# Patient Record
Sex: FEMALE | Race: WHITE | NOT HISPANIC OR LATINO
[De-identification: names, ages, dates, MRNs, and addresses within clinical notes are randomized per-mention and may not be internally consistent; named-entity substitution may affect disease eponyms.]

---

## 2018-05-01 ENCOUNTER — APPOINTMENT (OUTPATIENT)
Dept: OBGYN | Facility: CLINIC | Age: 53
End: 2018-05-01
Payer: COMMERCIAL

## 2018-05-01 ENCOUNTER — APPOINTMENT (OUTPATIENT)
Dept: OBGYN | Facility: CLINIC | Age: 53
End: 2018-05-01

## 2018-05-01 VITALS
DIASTOLIC BLOOD PRESSURE: 79 MMHG | HEIGHT: 69 IN | WEIGHT: 200 LBS | SYSTOLIC BLOOD PRESSURE: 128 MMHG | BODY MASS INDEX: 29.62 KG/M2

## 2018-05-01 PROCEDURE — 82270 OCCULT BLOOD FECES: CPT

## 2018-05-01 PROCEDURE — 87210 SMEAR WET MOUNT SALINE/INK: CPT | Mod: QW

## 2018-05-01 PROCEDURE — 99396 PREV VISIT EST AGE 40-64: CPT

## 2018-05-02 LAB — HPV HIGH+LOW RISK DNA PNL CVX: NOT DETECTED

## 2018-05-06 LAB — CYTOLOGY CVX/VAG DOC THIN PREP: NORMAL

## 2018-05-22 ENCOUNTER — APPOINTMENT (OUTPATIENT)
Dept: OBGYN | Facility: CLINIC | Age: 53
End: 2018-05-22

## 2018-12-04 ENCOUNTER — OTHER (OUTPATIENT)
Age: 53
End: 2018-12-04

## 2019-01-03 ENCOUNTER — OTHER (OUTPATIENT)
Age: 54
End: 2019-01-03

## 2019-08-08 ENCOUNTER — OTHER (OUTPATIENT)
Age: 54
End: 2019-08-08

## 2019-10-21 ENCOUNTER — APPOINTMENT (OUTPATIENT)
Dept: OBGYN | Facility: CLINIC | Age: 54
End: 2019-10-21
Payer: COMMERCIAL

## 2019-10-21 VITALS
BODY MASS INDEX: 29.62 KG/M2 | SYSTOLIC BLOOD PRESSURE: 119 MMHG | DIASTOLIC BLOOD PRESSURE: 80 MMHG | WEIGHT: 200 LBS | HEIGHT: 69 IN

## 2019-10-21 PROCEDURE — 99396 PREV VISIT EST AGE 40-64: CPT

## 2019-10-21 PROCEDURE — 87210 SMEAR WET MOUNT SALINE/INK: CPT | Mod: QW

## 2019-10-21 PROCEDURE — 82270 OCCULT BLOOD FECES: CPT

## 2019-10-21 NOTE — PHYSICAL EXAM
[Awake] : awake [Alert] : alert [Acute Distress] : no acute distress [Nipple Discharge] : no nipple discharge [Mass] : no breast mass [Axillary LAD] : no axillary lymphadenopathy [Soft] : soft [Tender] : non tender [Oriented x3] : oriented to person, place, and time [Cystocele] : a cystocele [Normal] : uterus [No Bleeding] : there was no active vaginal bleeding [Uterine Adnexae] : were not tender and not enlarged [Occult Blood] : occult blood test from digital rectal exam was negative [No Tenderness] : no rectal tenderness [Nl Sphincter Tone] : normal sphincter tone [RRR, No Murmurs] : RRR, no murmurs [External Hemorrhoid] : no external hemorrhoids [CTAB] : CTAB

## 2019-10-22 LAB — HPV HIGH+LOW RISK DNA PNL CVX: NOT DETECTED

## 2019-10-25 LAB — CYTOLOGY CVX/VAG DOC THIN PREP: NORMAL

## 2019-11-05 ENCOUNTER — APPOINTMENT (OUTPATIENT)
Dept: OBGYN | Facility: CLINIC | Age: 54
End: 2019-11-05
Payer: COMMERCIAL

## 2019-11-05 ENCOUNTER — ASOB RESULT (OUTPATIENT)
Age: 54
End: 2019-11-05

## 2019-11-05 PROCEDURE — 76830 TRANSVAGINAL US NON-OB: CPT

## 2020-10-27 ENCOUNTER — APPOINTMENT (OUTPATIENT)
Dept: OBGYN | Facility: CLINIC | Age: 55
End: 2020-10-27
Payer: COMMERCIAL

## 2020-10-27 VITALS
BODY MASS INDEX: 28.88 KG/M2 | HEIGHT: 69 IN | SYSTOLIC BLOOD PRESSURE: 155 MMHG | DIASTOLIC BLOOD PRESSURE: 100 MMHG | WEIGHT: 195 LBS

## 2020-10-27 DIAGNOSIS — N83.202 UNSPECIFIED OVARIAN CYST, LEFT SIDE: ICD-10-CM

## 2020-10-27 DIAGNOSIS — B37.9 CANDIDIASIS, UNSPECIFIED: ICD-10-CM

## 2020-10-27 DIAGNOSIS — E11.9 TYPE 2 DIABETES MELLITUS W/OUT COMPLICATIONS: ICD-10-CM

## 2020-10-27 PROCEDURE — 82270 OCCULT BLOOD FECES: CPT

## 2020-10-27 PROCEDURE — 99396 PREV VISIT EST AGE 40-64: CPT

## 2020-10-27 PROCEDURE — 99072 ADDL SUPL MATRL&STAF TM PHE: CPT

## 2020-10-27 PROCEDURE — 87210 SMEAR WET MOUNT SALINE/INK: CPT | Mod: QW

## 2020-10-27 RX ORDER — TERCONAZOLE 80 MG/1
80 SUPPOSITORY VAGINAL
Qty: 1 | Refills: 3 | Status: ACTIVE | COMMUNITY
Start: 2020-10-27 | End: 1900-01-01

## 2020-10-27 NOTE — PHYSICAL EXAM
[Awake] : awake [Alert] : alert [Acute Distress] : no acute distress [Mass] : no breast mass [Nipple Discharge] : no nipple discharge [Axillary LAD] : no axillary lymphadenopathy [Soft] : soft [Tender] : non tender [Oriented x3] : oriented to person, place, and time [Normal] : uterus [Cystocele] : a cystocele [Discharge] : a  ~M vaginal discharge was present [Scant] : scant [Thin] : thin [No Bleeding] : there was no active vaginal bleeding [Uterine Adnexae] : were not tender and not enlarged [No Tenderness] : no rectal tenderness [Occult Blood] : occult blood test from digital rectal exam was negative [Nl Sphincter Tone] : normal sphincter tone [External Hemorrhoid] : no external hemorrhoids [RRR, No Murmurs] : RRR, no murmurs [CTAB] : CTAB

## 2020-10-28 LAB — HPV HIGH+LOW RISK DNA PNL CVX: NOT DETECTED

## 2020-11-02 LAB — CYTOLOGY CVX/VAG DOC THIN PREP: NORMAL

## 2020-11-04 ENCOUNTER — APPOINTMENT (OUTPATIENT)
Dept: OBGYN | Facility: CLINIC | Age: 55
End: 2020-11-04
Payer: COMMERCIAL

## 2020-11-04 ENCOUNTER — ASOB RESULT (OUTPATIENT)
Age: 55
End: 2020-11-04

## 2020-11-04 PROCEDURE — 99072 ADDL SUPL MATRL&STAF TM PHE: CPT

## 2020-11-04 PROCEDURE — 76830 TRANSVAGINAL US NON-OB: CPT

## 2020-11-06 ENCOUNTER — NON-APPOINTMENT (OUTPATIENT)
Age: 55
End: 2020-11-06

## 2020-11-10 ENCOUNTER — APPOINTMENT (OUTPATIENT)
Dept: OBGYN | Facility: CLINIC | Age: 55
End: 2020-11-10
Payer: COMMERCIAL

## 2020-11-10 VITALS
SYSTOLIC BLOOD PRESSURE: 155 MMHG | BODY MASS INDEX: 28.88 KG/M2 | WEIGHT: 195 LBS | DIASTOLIC BLOOD PRESSURE: 93 MMHG | HEIGHT: 69 IN

## 2020-11-10 PROCEDURE — 99214 OFFICE O/P EST MOD 30 MIN: CPT

## 2020-11-10 PROCEDURE — 99072 ADDL SUPL MATRL&STAF TM PHE: CPT

## 2020-11-10 NOTE — PHYSICAL EXAM
[Labia Majora] : normal [Labia Minora] : normal [Normal] : normal [Uterine Adnexae] : normal [FreeTextEntry4] : 1.5 cm cystic mass at 3-4 o'clock lateral to cervix, non tender.

## 2021-01-11 ENCOUNTER — ASOB RESULT (OUTPATIENT)
Age: 56
End: 2021-01-11

## 2021-01-11 ENCOUNTER — APPOINTMENT (OUTPATIENT)
Dept: OBGYN | Facility: CLINIC | Age: 56
End: 2021-01-11
Payer: COMMERCIAL

## 2021-01-11 PROCEDURE — 76830 TRANSVAGINAL US NON-OB: CPT

## 2021-01-11 PROCEDURE — 99072 ADDL SUPL MATRL&STAF TM PHE: CPT

## 2021-01-13 ENCOUNTER — TRANSCRIPTION ENCOUNTER (OUTPATIENT)
Age: 56
End: 2021-01-13

## 2021-02-09 ENCOUNTER — NON-APPOINTMENT (OUTPATIENT)
Age: 56
End: 2021-02-09

## 2021-03-01 ENCOUNTER — NON-APPOINTMENT (OUTPATIENT)
Age: 56
End: 2021-03-01

## 2022-07-12 ENCOUNTER — NON-APPOINTMENT (OUTPATIENT)
Age: 57
End: 2022-07-12

## 2022-07-20 ENCOUNTER — NON-APPOINTMENT (OUTPATIENT)
Age: 57
End: 2022-07-20

## 2022-10-14 ENCOUNTER — NON-APPOINTMENT (OUTPATIENT)
Age: 57
End: 2022-10-14

## 2022-10-18 ENCOUNTER — APPOINTMENT (OUTPATIENT)
Dept: OBGYN | Facility: CLINIC | Age: 57
End: 2022-10-18

## 2022-10-18 VITALS
BODY MASS INDEX: 29.18 KG/M2 | HEIGHT: 69 IN | DIASTOLIC BLOOD PRESSURE: 88 MMHG | SYSTOLIC BLOOD PRESSURE: 172 MMHG | WEIGHT: 197 LBS

## 2022-10-18 DIAGNOSIS — N89.8 OTHER SPECIFIED NONINFLAMMATORY DISORDERS OF VAGINA: ICD-10-CM

## 2022-10-18 PROCEDURE — 82270 OCCULT BLOOD FECES: CPT

## 2022-10-18 PROCEDURE — 99396 PREV VISIT EST AGE 40-64: CPT

## 2022-10-18 PROCEDURE — 87210 SMEAR WET MOUNT SALINE/INK: CPT | Mod: QW

## 2022-10-18 NOTE — PHYSICAL EXAM
[Awake] : awake [Alert] : alert [Acute Distress] : no acute distress [Mass] : no breast mass [Nipple Discharge] : no nipple discharge [Axillary LAD] : no axillary lymphadenopathy [Soft] : soft [Tender] : non tender [Oriented x3] : oriented to person, place, and time [Normal] : uterus [Cystocele] : a cystocele [Discharge] : no discharge [No Bleeding] : there was no active vaginal bleeding [Uterine Adnexae] : were not tender and not enlarged [No Tenderness] : no rectal tenderness [Occult Blood] : occult blood test from digital rectal exam was negative [Nl Sphincter Tone] : normal sphincter tone [External Hemorrhoid] : no external hemorrhoids [RRR, No Murmurs] : RRR, no murmurs [CTAB] : CTAB

## 2022-10-20 LAB — HPV HIGH+LOW RISK DNA PNL CVX: NOT DETECTED

## 2022-10-25 ENCOUNTER — APPOINTMENT (OUTPATIENT)
Dept: OBGYN | Facility: CLINIC | Age: 57
End: 2022-10-25

## 2022-10-25 ENCOUNTER — ASOB RESULT (OUTPATIENT)
Age: 57
End: 2022-10-25

## 2022-10-25 LAB — CYTOLOGY CVX/VAG DOC THIN PREP: NORMAL

## 2022-10-25 PROCEDURE — 76830 TRANSVAGINAL US NON-OB: CPT

## 2023-10-13 DIAGNOSIS — Z12.39 ENCOUNTER FOR OTHER SCREENING FOR MALIGNANT NEOPLASM OF BREAST: ICD-10-CM

## 2023-10-22 ENCOUNTER — NON-APPOINTMENT (OUTPATIENT)
Age: 58
End: 2023-10-22

## 2023-11-01 ENCOUNTER — NON-APPOINTMENT (OUTPATIENT)
Age: 58
End: 2023-11-01

## 2023-11-07 ENCOUNTER — APPOINTMENT (OUTPATIENT)
Dept: OBGYN | Facility: CLINIC | Age: 58
End: 2023-11-07
Payer: COMMERCIAL

## 2023-11-07 VITALS
BODY MASS INDEX: 24.44 KG/M2 | WEIGHT: 165 LBS | HEIGHT: 69 IN | DIASTOLIC BLOOD PRESSURE: 70 MMHG | SYSTOLIC BLOOD PRESSURE: 118 MMHG

## 2023-11-07 PROCEDURE — 99396 PREV VISIT EST AGE 40-64: CPT

## 2023-11-07 PROCEDURE — 87210 SMEAR WET MOUNT SALINE/INK: CPT | Mod: QW

## 2023-11-07 PROCEDURE — 82270 OCCULT BLOOD FECES: CPT

## 2023-11-08 LAB
CYTOLOGY CVX/VAG DOC THIN PREP: NORMAL
HPV HIGH+LOW RISK DNA PNL CVX: NOT DETECTED

## 2023-11-11 LAB — CYTOLOGY CVX/VAG DOC THIN PREP: NORMAL

## 2023-12-05 ENCOUNTER — ASOB RESULT (OUTPATIENT)
Age: 58
End: 2023-12-05

## 2023-12-05 ENCOUNTER — APPOINTMENT (OUTPATIENT)
Dept: OBGYN | Facility: CLINIC | Age: 58
End: 2023-12-05
Payer: COMMERCIAL

## 2023-12-05 PROCEDURE — 76830 TRANSVAGINAL US NON-OB: CPT

## 2023-12-06 ENCOUNTER — NON-APPOINTMENT (OUTPATIENT)
Age: 58
End: 2023-12-06

## 2024-10-01 ENCOUNTER — NON-APPOINTMENT (OUTPATIENT)
Age: 59
End: 2024-10-01

## 2024-10-01 ENCOUNTER — APPOINTMENT (OUTPATIENT)
Dept: OBGYN | Facility: CLINIC | Age: 59
End: 2024-10-01
Payer: COMMERCIAL

## 2024-10-01 PROCEDURE — 99459 PELVIC EXAMINATION: CPT

## 2024-10-01 PROCEDURE — 99214 OFFICE O/P EST MOD 30 MIN: CPT

## 2024-10-01 NOTE — CHIEF COMPLAINT
[FreeTextEntry1] : Here for evaluation of possible UTI. c/o 2 weeks of painful urination accompanies with burning and pressure Had been on a course of Cipro x 7 and Macrobid x 5 without relief. Has taken Azo which relieved the pain but not the burning. u/a: tr leuk and blood

## 2024-10-01 NOTE — PHYSICAL EXAM
[Chaperone Present] : A chaperone was present in the examining room during all aspects of the physical examination [[id number]] : A chaperone was present during the pelvic exam. [FreeTextEntry2] : Mini [Normal] : uterus [No Bleeding] : there was no active vaginal bleeding [Uterine Adnexae] : were not tender and not enlarged [FreeTextEntry4] : tomas olivas

## 2024-10-03 LAB — BACTERIA UR CULT: NORMAL

## 2024-10-04 ENCOUNTER — APPOINTMENT (OUTPATIENT)
Dept: OBGYN | Facility: CLINIC | Age: 59
End: 2024-10-04
Payer: COMMERCIAL

## 2024-10-04 DIAGNOSIS — R30.0 DYSURIA: ICD-10-CM

## 2024-10-04 PROCEDURE — ZZZZZ: CPT

## 2024-10-06 ENCOUNTER — INPATIENT (INPATIENT)
Facility: HOSPITAL | Age: 59
LOS: 2 days | Discharge: HOME | End: 2024-10-09
Attending: INTERNAL MEDICINE | Admitting: INTERNAL MEDICINE
Payer: COMMERCIAL

## 2024-10-06 VITALS
RESPIRATION RATE: 18 BRPM | SYSTOLIC BLOOD PRESSURE: 115 MMHG | OXYGEN SATURATION: 98 % | WEIGHT: 165.35 LBS | DIASTOLIC BLOOD PRESSURE: 70 MMHG | TEMPERATURE: 98 F | HEART RATE: 85 BPM | HEIGHT: 68 IN

## 2024-10-06 DIAGNOSIS — I10 ESSENTIAL (PRIMARY) HYPERTENSION: ICD-10-CM

## 2024-10-06 DIAGNOSIS — E11.9 TYPE 2 DIABETES MELLITUS WITHOUT COMPLICATIONS: ICD-10-CM

## 2024-10-06 DIAGNOSIS — K80.20 CALCULUS OF GALLBLADDER WITHOUT CHOLECYSTITIS WITHOUT OBSTRUCTION: ICD-10-CM

## 2024-10-06 DIAGNOSIS — N39.0 URINARY TRACT INFECTION, SITE NOT SPECIFIED: ICD-10-CM

## 2024-10-06 DIAGNOSIS — D72.825 BANDEMIA: ICD-10-CM

## 2024-10-06 DIAGNOSIS — Z29.9 ENCOUNTER FOR PROPHYLACTIC MEASURES, UNSPECIFIED: ICD-10-CM

## 2024-10-06 LAB
ALBUMIN SERPL ELPH-MCNC: 4.4 G/DL — SIGNIFICANT CHANGE UP (ref 3.3–5)
ALP SERPL-CCNC: 74 U/L — SIGNIFICANT CHANGE UP (ref 40–120)
ALT FLD-CCNC: 25 U/L — SIGNIFICANT CHANGE UP (ref 4–33)
ANION GAP SERPL CALC-SCNC: 20 MMOL/L — HIGH (ref 7–14)
APPEARANCE UR: ABNORMAL
AST SERPL-CCNC: 26 U/L — SIGNIFICANT CHANGE UP (ref 4–32)
BACTERIA UR CULT: NORMAL
BASOPHILS # BLD AUTO: 0.08 K/UL — SIGNIFICANT CHANGE UP (ref 0–0.2)
BASOPHILS NFR BLD AUTO: 0.9 % — SIGNIFICANT CHANGE UP (ref 0–2)
BILIRUB SERPL-MCNC: 0.7 MG/DL — SIGNIFICANT CHANGE UP (ref 0.2–1.2)
BILIRUB UR-MCNC: ABNORMAL
BUN SERPL-MCNC: 22 MG/DL — SIGNIFICANT CHANGE UP (ref 7–23)
CALCIUM SERPL-MCNC: 9.2 MG/DL — SIGNIFICANT CHANGE UP (ref 8.4–10.5)
CHLORIDE SERPL-SCNC: 92 MMOL/L — LOW (ref 98–107)
CO2 SERPL-SCNC: 23 MMOL/L — SIGNIFICANT CHANGE UP (ref 22–31)
COLOR SPEC: SIGNIFICANT CHANGE UP
CREAT SERPL-MCNC: 0.94 MG/DL — SIGNIFICANT CHANGE UP (ref 0.5–1.3)
DIFF PNL FLD: ABNORMAL
EGFR: 70 ML/MIN/1.73M2 — SIGNIFICANT CHANGE UP
EOSINOPHIL # BLD AUTO: 0.23 K/UL — SIGNIFICANT CHANGE UP (ref 0–0.5)
EOSINOPHIL NFR BLD AUTO: 2.6 % — SIGNIFICANT CHANGE UP (ref 0–6)
GLUCOSE SERPL-MCNC: 155 MG/DL — HIGH (ref 70–99)
GLUCOSE UR QL: >=1000 MG/DL
HCT VFR BLD CALC: 40 % — SIGNIFICANT CHANGE UP (ref 34.5–45)
HGB BLD-MCNC: 13.9 G/DL — SIGNIFICANT CHANGE UP (ref 11.5–15.5)
IANC: 6.76 K/UL — SIGNIFICANT CHANGE UP (ref 1.8–7.4)
KETONES UR-MCNC: 15 MG/DL
LEUKOCYTE ESTERASE UR-ACNC: ABNORMAL
LIDOCAIN IGE QN: 31 U/L — SIGNIFICANT CHANGE UP (ref 7–60)
LYMPHOCYTES # BLD AUTO: 0.55 K/UL — LOW (ref 1–3.3)
LYMPHOCYTES # BLD AUTO: 6.2 % — LOW (ref 13–44)
MCHC RBC-ENTMCNC: 29.6 PG — SIGNIFICANT CHANGE UP (ref 27–34)
MCHC RBC-ENTMCNC: 34.8 GM/DL — SIGNIFICANT CHANGE UP (ref 32–36)
MCV RBC AUTO: 85.3 FL — SIGNIFICANT CHANGE UP (ref 80–100)
MONOCYTES # BLD AUTO: 0.39 K/UL — SIGNIFICANT CHANGE UP (ref 0–0.9)
MONOCYTES NFR BLD AUTO: 4.4 % — SIGNIFICANT CHANGE UP (ref 2–14)
NEUTROPHILS # BLD AUTO: 7.05 K/UL — SIGNIFICANT CHANGE UP (ref 1.8–7.4)
NEUTROPHILS NFR BLD AUTO: 54 % — SIGNIFICANT CHANGE UP (ref 43–77)
NITRITE UR-MCNC: POSITIVE
PH UR: 6 — SIGNIFICANT CHANGE UP (ref 5–8)
PLATELET # BLD AUTO: 179 K/UL — SIGNIFICANT CHANGE UP (ref 150–400)
POTASSIUM SERPL-MCNC: 3.3 MMOL/L — LOW (ref 3.5–5.3)
POTASSIUM SERPL-SCNC: 3.3 MMOL/L — LOW (ref 3.5–5.3)
PROT SERPL-MCNC: 7.3 G/DL — SIGNIFICANT CHANGE UP (ref 6–8.3)
PROT UR-MCNC: 30 MG/DL
RBC # BLD: 4.69 M/UL — SIGNIFICANT CHANGE UP (ref 3.8–5.2)
RBC # FLD: 12.3 % — SIGNIFICANT CHANGE UP (ref 10.3–14.5)
SODIUM SERPL-SCNC: 135 MMOL/L — SIGNIFICANT CHANGE UP (ref 135–145)
SP GR SPEC: 1.02 — SIGNIFICANT CHANGE UP (ref 1–1.03)
UROBILINOGEN FLD QL: 1 MG/DL — SIGNIFICANT CHANGE UP (ref 0.2–1)
WBC # BLD: 8.84 K/UL — SIGNIFICANT CHANGE UP (ref 3.8–10.5)
WBC # FLD AUTO: 8.84 K/UL — SIGNIFICANT CHANGE UP (ref 3.8–10.5)

## 2024-10-06 PROCEDURE — 74177 CT ABD & PELVIS W/CONTRAST: CPT | Mod: 26,MC

## 2024-10-06 PROCEDURE — 99223 1ST HOSP IP/OBS HIGH 75: CPT

## 2024-10-06 PROCEDURE — 99285 EMERGENCY DEPT VISIT HI MDM: CPT

## 2024-10-06 RX ORDER — GLUCAGON INJECTION, SOLUTION 0.5 MG/.1ML
1 INJECTION, SOLUTION SUBCUTANEOUS ONCE
Refills: 0 | Status: DISCONTINUED | OUTPATIENT
Start: 2024-10-06 | End: 2024-10-09

## 2024-10-06 RX ORDER — DAPAGLIFLOZIN 10 MG/1
1 TABLET, FILM COATED ORAL
Refills: 0 | DISCHARGE

## 2024-10-06 RX ORDER — SENNOSIDES 8.6 MG
2 TABLET ORAL AT BEDTIME
Refills: 0 | Status: DISCONTINUED | OUTPATIENT
Start: 2024-10-06 | End: 2024-10-09

## 2024-10-06 RX ORDER — PHENAZOPYRIDINE HCL 200 MG
100 TABLET ORAL EVERY 8 HOURS
Refills: 0 | Status: DISCONTINUED | OUTPATIENT
Start: 2024-10-06 | End: 2024-10-09

## 2024-10-06 RX ORDER — SODIUM CHLORIDE 0.9 % (FLUSH) 0.9 %
1000 SYRINGE (ML) INJECTION ONCE
Refills: 0 | Status: COMPLETED | OUTPATIENT
Start: 2024-10-06 | End: 2024-10-06

## 2024-10-06 RX ORDER — GLIMEPIRIDE 1 MG/1
1 TABLET ORAL
Refills: 0 | DISCHARGE

## 2024-10-06 RX ORDER — SODIUM CHLORIDE IRRIG SOLUTION 0.9 %
1000 SOLUTION, IRRIGATION IRRIGATION
Refills: 0 | Status: DISCONTINUED | OUTPATIENT
Start: 2024-10-06 | End: 2024-10-09

## 2024-10-06 RX ORDER — ACETAMINOPHEN 325 MG
650 TABLET ORAL EVERY 6 HOURS
Refills: 0 | Status: DISCONTINUED | OUTPATIENT
Start: 2024-10-06 | End: 2024-10-09

## 2024-10-06 RX ORDER — KETOROLAC TROMETHAMINE 10 MG/1
15 TABLET, FILM COATED ORAL ONCE
Refills: 0 | Status: DISCONTINUED | OUTPATIENT
Start: 2024-10-06 | End: 2024-10-06

## 2024-10-06 RX ORDER — LOSARTAN POTASSIUM 100 MG/1
1 TABLET, FILM COATED ORAL
Refills: 0 | DISCHARGE

## 2024-10-06 RX ORDER — ALCOHOL ANTISEPTIC PADS
15 PADS, MEDICATED (EA) TOPICAL ONCE
Refills: 0 | Status: DISCONTINUED | OUTPATIENT
Start: 2024-10-06 | End: 2024-10-09

## 2024-10-06 RX ORDER — ALCOHOL ANTISEPTIC PADS
25 PADS, MEDICATED (EA) TOPICAL ONCE
Refills: 0 | Status: DISCONTINUED | OUTPATIENT
Start: 2024-10-06 | End: 2024-10-09

## 2024-10-06 RX ORDER — MAG HYDROX/ALUMINUM HYD/SIMETH 200-200-20
30 SUSPENSION, ORAL (FINAL DOSE FORM) ORAL EVERY 4 HOURS
Refills: 0 | Status: DISCONTINUED | OUTPATIENT
Start: 2024-10-06 | End: 2024-10-06

## 2024-10-06 RX ORDER — ONDANSETRON HCL/PF 4 MG/2 ML
4 VIAL (ML) INJECTION EVERY 8 HOURS
Refills: 0 | Status: DISCONTINUED | OUTPATIENT
Start: 2024-10-06 | End: 2024-10-06

## 2024-10-06 RX ORDER — ERTAPENEM 1 G/1
1000 INJECTION, POWDER, LYOPHILIZED, FOR SOLUTION INTRAMUSCULAR; INTRAVENOUS ONCE
Refills: 0 | Status: COMPLETED | OUTPATIENT
Start: 2024-10-06 | End: 2024-10-06

## 2024-10-06 RX ORDER — METFORMIN HCL 500 MG
1 TABLET ORAL
Refills: 0 | DISCHARGE

## 2024-10-06 RX ORDER — DULAGLUTIDE 4.5 MG/.5ML
4.5 INJECTION, SOLUTION SUBCUTANEOUS
Refills: 0 | DISCHARGE

## 2024-10-06 RX ORDER — INSULIN LISPRO 100/ML
VIAL (ML) SUBCUTANEOUS AT BEDTIME
Refills: 0 | Status: DISCONTINUED | OUTPATIENT
Start: 2024-10-06 | End: 2024-10-09

## 2024-10-06 RX ORDER — GLYBURIDE 2.5 MG/1
1 TABLET ORAL
Refills: 0 | DISCHARGE

## 2024-10-06 RX ORDER — CEFEPIME 2 G/1
1000 INJECTION, POWDER, FOR SOLUTION INTRAVENOUS EVERY 12 HOURS
Refills: 0 | Status: DISCONTINUED | OUTPATIENT
Start: 2024-10-07 | End: 2024-10-07

## 2024-10-06 RX ORDER — INSULIN LISPRO 100/ML
VIAL (ML) SUBCUTANEOUS
Refills: 0 | Status: DISCONTINUED | OUTPATIENT
Start: 2024-10-06 | End: 2024-10-09

## 2024-10-06 RX ORDER — ALCOHOL ANTISEPTIC PADS
12.5 PADS, MEDICATED (EA) TOPICAL ONCE
Refills: 0 | Status: DISCONTINUED | OUTPATIENT
Start: 2024-10-06 | End: 2024-10-09

## 2024-10-06 RX ORDER — FAMOTIDINE 40 MG
20 TABLET ORAL ONCE
Refills: 0 | Status: COMPLETED | OUTPATIENT
Start: 2024-10-06 | End: 2024-10-06

## 2024-10-06 RX ORDER — ENOXAPARIN SODIUM 150 MG/ML
40 INJECTION SUBCUTANEOUS EVERY 24 HOURS
Refills: 0 | Status: DISCONTINUED | OUTPATIENT
Start: 2024-10-06 | End: 2024-10-09

## 2024-10-06 RX ORDER — 5-HYDROXYTRYPTOPHAN (5-HTP) 100 MG
3 TABLET,DISINTEGRATING ORAL AT BEDTIME
Refills: 0 | Status: DISCONTINUED | OUTPATIENT
Start: 2024-10-06 | End: 2024-10-09

## 2024-10-06 RX ADMIN — Medication 40 MILLIEQUIVALENT(S): at 15:17

## 2024-10-06 RX ADMIN — Medication 650 MILLIGRAM(S): at 19:10

## 2024-10-06 RX ADMIN — ERTAPENEM 120 MILLIGRAM(S): 1 INJECTION, POWDER, LYOPHILIZED, FOR SOLUTION INTRAMUSCULAR; INTRAVENOUS at 17:48

## 2024-10-06 RX ADMIN — Medication 1000 MILLILITER(S): at 14:29

## 2024-10-06 RX ADMIN — ENOXAPARIN SODIUM 40 MILLIGRAM(S): 150 INJECTION SUBCUTANEOUS at 19:36

## 2024-10-06 NOTE — ED PROVIDER NOTE - OBJECTIVE STATEMENT
59-year-old female with no significant past medical history presented to the ED complaining having lower abdominal pressure and pain it has been on for past several days to weeks.  She states approximately 3 weeks ago she was started on antibiotics for a UTI she states that she took a 5-day course which did not give her really much relief she went back to the doctor who then change antibiotics to 1 week course.  She states that she completed the antibiotic and was still having some discomfort and urgency frequency.  She also states that she was having some flank pain as well.  She denies having any episodes of nausea or vomiting denies having any diarrhea she admits to having some constipation as well.  She denies any chest pain exertional dyspnea dysuria recent travel or sick contacts.  She states that she has not had any intercourse recently and denies any vaginal bleeding or discharge. 59-year-old female with no significant past medical history presented to the ED complaining having lower abdominal pressure and pain it has been on for past several days to weeks.  She states approximately 3 weeks ago she was started on antibiotics for a UTI she states that she took a 5-day course which did not give her really much relief she went back to the doctor who then change antibiotics to 1 week course.  She states that she completed the antibiotic and was still having some discomfort and urgency frequency.  She also states that she was having some flank pain as well.  She denies having any episodes of nausea or vomiting denies having any diarrhea she admits to having some constipation as well.  She denies any chest pain exertional dyspnea dysuria recent travel or sick contacts.  She states that she has not had any intercourse recently and denies any vaginal bleeding or discharge. Pt states that she was on macrobid 5 day course and then cipro 7 day course.

## 2024-10-06 NOTE — H&P ADULT - PROBLEM SELECTOR PLAN 3
Cholelithiasis found incidentally on CTAP, no evidence of cholecystitis. Patient has no RUQ tenderness or biliary cholic. Afebrile, no leukocytosis, tbili 0.7.       - no leukocytosis  - CTM Cholelithiasis found incidentally on CTAP, no evidence of cholecystitis. Patient has no RUQ tenderness or biliary cholic. Afebrile, no leukocytosis, tbili 0.7. Afebrile, no GERD, no Hess's sign, no RUQ tenderness.     - no leukocytosis  - CTM Cholelithiasis found incidentally on CTAP, no evidence of cholecystitis. Patient has no RUQ tenderness or biliary cholic. Afebrile, no leukocytosis, tbili 0.7. Afebrile, no GERD, no Hess's sign.    - no leukocytosis  - CTM

## 2024-10-06 NOTE — H&P ADULT - PROBLEM SELECTOR PLAN 1
UTI s/p Ciprofloxacin x 7 days and Macrobid x 5 days. S/p 1g ertapenem in the ED. Urinalysis with turpid urine, protein 30, ketone 15, moderate LE, positive nitrites, 717 WBC, 6 RBC, >1000 glucosuria.    - f/u ucx  - f/u bcx UTI s/p Ciprofloxacin x 7 days and Macrobid x 5 days. S/p 1g ertapenem in the ED. Urinalysis with turpid urine, protein 30, ketone 15, moderate LE, positive nitrites, 717 WBC, 6 RBC, >1000 glucosuria. Urine culture outpatient on 10/4 only with urogenital payton. Could also be cystitis versus nephrolithiasis.    - f/u ucx  - f/u bcx  - c/w zosyn 3.375 q8h  - can consider US to explore nephrolithiasis UTI s/p Ciprofloxacin x 7 days and Macrobid x 5 days. S/p 1g ertapenem in the ED. Urinalysis with turpid urine, protein 30, ketone 15, moderate LE, positive nitrites, 717 WBC, 6 RBC, >1000 glucosuria. Urine culture outpatient on 10/4 only with urogenital payton. Could also be cystitis versus nephrolithiasis.    - f/u ucx  - f/u bcx  - start cefepime 1g q12h

## 2024-10-06 NOTE — ED ADULT NURSE NOTE - NSFALLUNIVINTERV_ED_ALL_ED
Bed/Stretcher in lowest position, wheels locked, appropriate side rails in place/Call bell, personal items and telephone in reach/Instruct patient to call for assistance before getting out of bed/chair/stretcher/Non-slip footwear applied when patient is off stretcher/[place] to call system/Physically safe environment - no spills, clutter or unnecessary equipment/Purposeful proactive rounding/Room/bathroom lighting operational, light cord in reach

## 2024-10-06 NOTE — ED ADULT NURSE NOTE - OBJECTIVE STATEMENT
Pt received c/o abdominal pain pain x 2 weeks. recently treated with abx for UTI. Pt reporting feeling like UTI was coming back a few days ago so took AZO for symptoms without relief. Denies fevers, endorsing chills yesterday. 20g IV placed in L AC, labs drawn as ordered, UA/UC collected, all specimens sent to lab.  CT performed. Safety maintianed Pt received c/o abdominal pain pain x 2 weeks. recently treated with abx for UTI. Pt reporting feeling like UTI was coming back a few days ago so took AZO for symptoms without relief. Denies fevers, endorsing chills yesterday. 20g IV placed in R FA, labs drawn as ordered, UA/UC collected, all specimens sent to lab.  CT performed. Safety maintianed

## 2024-10-06 NOTE — H&P ADULT - HISTORY OF PRESENT ILLNESS
59-year-old female with DM, HTN,  presented to the ED complaining having lower abdominal pressure and painfor several weeks.  She states approximately 3 weeks ago she was started on antibiotics for a UTI for which she was prescribed a 5-day course of macrobid. She did not get relief from the antibiotics so she was switched to cipro x 7 days.  She states that she completed the antibiotic and was still having some discomfort, urgency frequency, and flank pain.  No n/v/d. She denies any chest pain exertional dyspnea dysuria recent travel or sick contacts.  She states that she has not had any intercourse recently and denies any vaginal bleeding or discharge.    ED: VSS T 98, HR 85, /70, RR 18, SA 98  Labs WBC 8.84 with 25% band neutrophils, K 3.3, Urinalysis with turpid urine, protein 30, ketone 15, moderate LE, positive nitrites, 717 WBC, 6 RBC, >1000 glucosuria  CTAP with cholelithiasis but no evidence of cholecystitis 59-year-old female with DM, HTN,  presented to the ED complaining having lower abdominal pressure and pain for several weeks.  She states approximately 3 weeks ago she was started on antibiotics for a UTI for which she was prescribed a 5-day course of macrobid. She did not get relief from the antibiotics so she was switched to cipro x 7 days.  She states that she completed the antibiotic and was still having some discomfort, urgency, frequency, and flank pain.  No n/v/d. She denies any chest pain exertional dyspnea dysuria recent travel or sick contacts.  She states that she has not had any intercourse recently and denies any vaginal bleeding or discharge.    ED: VSS T 98, HR 85, /70, RR 18, SA 98  Labs WBC 8.84 with 25% band neutrophils, K 3.3, Urinalysis with turpid urine, protein 30, ketone 15, moderate LE, positive nitrites, 717 WBC, 6 RBC, >1000 glucosuria  CTAP with cholelithiasis but no evidence of cholecystitis 59-year-old female with DM, HTN,  presented to the ED complaining having lower abdominal pressure and pain for several weeks.  She states approximately 3 weeks ago she was started on antibiotics for a UTI for which she was prescribed a 5-day course of macrobid. She did not get relief from the antibiotics so she was switched to cipro x 7 days.  She states that she completed the antibiotic and was still having some discomfort, urgency, frequency, flank pain, and dysuria.  No n/v/d. Last night she endorsed subjective fever and chills. She took tylenol. She denies any chest pain exertional dyspnea dysuria recent travel or sick contacts.  She states that she has not had any intercourse recently and denies any vaginal bleeding or discharge.    ED: VSS T 98, HR 85, /70, RR 18, SA 98  Labs WBC 8.84 with 25% band neutrophils, K 3.3, Urinalysis with turbid urine, protein 30, ketone 15, moderate LE, positive nitrites, 717 WBC, 6 RBC, >1000 glucosuria  CTAP with cholelithiasis but no evidence of cholecystitis

## 2024-10-06 NOTE — ED ADULT TRIAGE NOTE - CHIEF COMPLAINT QUOTE
pt c/o of lower abd pain and dysuria for few days, pt recently treated for UTI, denies any nausea or vomiting

## 2024-10-06 NOTE — PATIENT PROFILE ADULT - DO YOU EVER NEED HELP READING HOSPITAL MATERIALS?
Head,  normocephalic,  atraumatic,  Face,  Face within normal limits,  Ears,  External ears within normal limits,  Nose/Nasopharynx,  External nose  normal appearance, no nasal discharge,  Mouth and Throat,  Oral cavity appearance normal Lips,  Appearance normal
no

## 2024-10-06 NOTE — H&P ADULT - ATTENDING COMMENTS
59 yr old female with a pmh of HTN, T2DM not on insulin, HLD not on statin, who presents with ~3 week hx of lower abdominal pressure, pain on urinary that has not resolved after a 5 day course of nitrofurantoin and a 7 day course of ciprofloxacin. She took Azo to help with the pain/dyscomfort with relief.   Endorsing mild constipation   Denies  headache, dizziness, chest pain, palpitations, SOB,  joint pain, diarrhea.  Vitals: T 98.1, HR 83, /55, RR 16 satting 98% RA    EKG interpreted by myself: NSR with prolonged Qtc  CT abdo/pelvis: interpreted by radiology: Cholelithiasis without CT evidence of cholecystitis.    REVIEW OF SYSTEMS:    CONSTITUTIONAL: No weakness, fevers or chills  EYES/ENT: No visual changes;  No dysphagia; No sore throat; No rhinorrhea; No sinus pain/pressure  NECK: No pain or stiffness  RESPIRATORY: No cough, wheezing, hemoptysis; No shortness of breath  CARDIOVASCULAR: No chest pain or palpitations; No lower extremity edema  GASTROINTESTINAL: + abdominal discomfort . No nausea, vomiting, or hematemesis; No diarrhea +constipation. No melena or hematochezia.  GENITOURINARY: + dysuria, no frequency or hematuria  NEUROLOGICAL: No numbness or weakness  MSK: ambulates without assistance   SKIN: No itching, burning, rashes, or lesions   All other review of systems is negative unless indicated above.    PHYSICAL EXAM:  GENERAL: NAD, comfortable at bedside   HEAD:  Atraumatic, Normocephalic  EYES: EOMI, PERRL, conjunctiva and sclera clear  NECK: Supple, No JVD  CHEST/LUNG: Clear to auscultation bilaterally; No wheezes, rales or rhonchi  HEART: Regular rate and rhythm; No murmurs, rubs, or gallops, (+)S1, S2  ABDOMEN: Soft, mild lower abdominal discomfort on palpation, Nondistended; Normal Bowel sounds   EXTREMITIES:  2+ Peripheral Pulses, No clubbing, cyanosis, or edema  PSYCH: normal mood and affect  NEUROLOGY: AAOx3, moving all extremities spontaneously   SKIN: No rashes or lesions    UTI/Bandemia: NEW  UA: LE: moderate, Nitrite: +, , RBC 6  Bands: 25.7%  CT adbo/pelvis as above.  s/p ertapenem in ED -> continue with cefepime pending Ucx/Bcx   Phenazopyridine 100mg Q8 PRN     HTN: Chronic stable /55  Hold HCTZ-Losartan 12/5-50mg daily - restart as appropriate, Monitor     T2DM: chronic moderate exacerbation -   Home regimen: metformin 1g BID, glimepiride 1mg daily, Farxiga 10mg daily, Trulicity 4.5mg weekly (Friday)  LDCS with diabetic diet  A1c and lipid panel in AM     HLD: chronic not taking statin as decided not to as has been having a lot of life stress  A1c and lipid panel in AM     Remainder as above    Medication list confirmed with pt at bedside

## 2024-10-06 NOTE — ED PROVIDER NOTE - CLINICAL SUMMARY MEDICAL DECISION MAKING FREE TEXT BOX
59-year-old female with no significant past medical history presented to the ED complaining having lower abdominal pressure and pain it has been on for past several days to weeks. Abdominal pain-will do labs, fluids, ct abd/pelvis and reassess

## 2024-10-06 NOTE — H&P ADULT - NSHPLABSRESULTS_GEN_ALL_CORE
LABS: When present labs, imaging, and ECG were personally reviewed                          13.9   8.84  )-----------( 179      ( 06 Oct 2024 14:06 )             40.0       10-    135  |  92[L]  |  22  ----------------------------<  155[H]  3.3[L]   |  23  |  0.94    Ca    9.2      06 Oct 2024 14:06    TPro  7.3  /  Alb  4.4  /  TBili  0.7  /  DBili  x   /  AST  26  /  ALT  25  /  AlkPhos  74  10-06       LIVER FUNCTIONS - ( 06 Oct 2024 14:06 )  Alb: 4.4 g/dL / Pro: 7.3 g/dL / ALK PHOS: 74 U/L / ALT: 25 U/L / AST: 26 U/L / GGT: x                    Urinalysis Basic - ( 06 Oct 2024 16:24 )    Color: Dark Yellow / Appearance: Turbid / S.020 / pH: x  Gluc: x / Ketone: 15 mg/dL  / Bili: Small / Urobili: 1.0 mg/dL   Blood: x / Protein: 30 mg/dL / Nitrite: Positive   Leuk Esterase: Moderate / RBC: 6 /HPF /  /HPF   Sq Epi: x / Non Sq Epi: 2 /HPF / Bacteria: Negative /HPF            Lactate Trend            CAPILLARY BLOOD GLUCOSE      POCT Blood Glucose.: 113 mg/dL (06 Oct 2024 16:18)            RADIOLOGY & ADDITIONAL TESTS:  < from: CT Abdomen and Pelvis w/ IV Cont (10.06.24 @ 15:41) >    IMPRESSION:  Cholelithiasis without CT evidence of cholecystitis.    < end of copied text >

## 2024-10-06 NOTE — H&P ADULT - PROBLEM SELECTOR PLAN 5
Normotensive on admission    - hold home bp meds Normotensive on admission    - hold home losartan 50mg daily

## 2024-10-06 NOTE — H&P ADULT - ASSESSMENT
59-year-old female with DM, HTN,  presented to the ED complaining having lower abdominal pressure and pain for last few weeks. Failed both macrobid and cipro, here for lack of resolution of urinary symptoms.

## 2024-10-06 NOTE — H&P ADULT - NSHPSOCIALHISTORY_GEN_ALL_CORE
Social tobacco use, social alcohol use, no illicit drug use.  Has 3 children  Works in customer service

## 2024-10-06 NOTE — H&P ADULT - PROBLEM SELECTOR PLAN 2
Patient with 25% band neutrophils, no leukocytosis in the setting of continued urinary tract infection    - c/w zosyn 3.375mg q8h Patient with 25% band neutrophils, no leukocytosis in the setting of continued urinary tract infection    - start cefepime 1g q12h

## 2024-10-06 NOTE — H&P ADULT - PROBLEM SELECTOR PLAN 4
A1C:    - low SS inpatient  - FS TIDAC  - hold home medication A1C:    - low SS inpatient  - FS TIDAC  - hold home metformin 1000 BID, farxiga 5mg daily, glyburide 1mg daily, trulicity 4.5mg weekly A1C:    - low SS inpatient  - FS TIDAC  - hold home metformin 1000 BID, farxiga 10mg daily, glyburide 1mg daily, trulicity 4.5mg weekly  - f/u lipid panel

## 2024-10-06 NOTE — H&P ADULT - NSHPPHYSICALEXAM_GEN_ALL_CORE
GENERAL:   EYES: Conjunctiva noninjected or pale, sclera anicteric  HENT: NC/AT, moist mucous membranes  NECK: Supple, trachea midline  LUNG: Nonlabored respirations, no wheezes, rales  CV: RRR, Pulses- Radial: 2+ b/l  ABDOMEN: Nondistended, nontender  MSK: No visible deformities, nontender extremities  SKIN: No rashes, bruises  NEURO: AAOx4 (to person, place, time, event), no tremor, steady gait  PSYCH: Normal mood and affect GENERAL:   EYES: Conjunctiva noninjected or pale, sclera anicteric  HENT: NC/AT, moist mucous membranes  NECK: Supple, trachea midline  LUNG: Nonlabored respirations, no wheezes, rales  CV: RRR, Pulses- Radial: 2+ b/l  ABDOMEN: Mild abdominal tenderness diffusely in the lower abdomen, no CVA tenderness, negative weaver's sign  MSK: No visible deformities, nontender extremities  SKIN: No rashes, bruises  NEURO: AAOx4 (to person, place, time, event), no tremor, steady gait  PSYCH: Normal mood and affect

## 2024-10-06 NOTE — H&P ADULT - PROBLEM SELECTOR PLAN 6
- DVT prophylaxis: lovenox 40mg daily  - Diet: Regular Diet  - GI ppx:  - Dispo: - DVT prophylaxis: lovenox 40mg daily  - Diet: DASH Diet  - GI ppx:  - Dispo:

## 2024-10-06 NOTE — H&P ADULT - NSHPREVIEWOFSYSTEMS_GEN_ALL_CORE
CONSTITUTIONAL: No fevers, chills, fatigue, dizziness, weakness, unexpected weight change  EYES: No double vision, blurry vision  ENT: No ear pain, nasal congestion, runny nose, sore throat  CV: No chest pain, palpitations  PULM: No cough, shortness of breath  GI: No abdominal pain, nausea, vomiting, diarrhea, constipation  : No dysuria, polyuria, hematuria  SKIN: No rashes, swelling  MSK: No muscle aches  NEURO: No headache, paresthesias  PSYCHIATRIC: Denies suicidal, homicidal ideations. No auditory, visual, tactile hallucinations CONSTITUTIONAL: No fevers, chills, fatigue, dizziness, weakness, unexpected weight change  EYES: No double vision, blurry vision  ENT: No ear pain, nasal congestion, runny nose, sore throat  CV: No chest pain, palpitations  PULM: No cough, shortness of breath  GI: + abdominal pain, no nausea, vomiting, diarrhea  : No dysuria, polyuria, hematuria  SKIN: No rashes, swelling  MSK: No muscle aches  NEURO: No headache, paresthesias  PSYCHIATRIC: Denies suicidal, homicidal ideations. No auditory, visual, tactile hallucinations

## 2024-10-07 LAB
A1C WITH ESTIMATED AVERAGE GLUCOSE RESULT: 6.7 % — HIGH (ref 4–5.6)
ADD ON TEST-SPECIMEN IN LAB: SIGNIFICANT CHANGE UP
ALBUMIN SERPL ELPH-MCNC: 3.9 G/DL — SIGNIFICANT CHANGE UP (ref 3.3–5)
ALP SERPL-CCNC: 68 U/L — SIGNIFICANT CHANGE UP (ref 40–120)
ALT FLD-CCNC: 23 U/L — SIGNIFICANT CHANGE UP (ref 4–33)
ANION GAP SERPL CALC-SCNC: 16 MMOL/L — HIGH (ref 7–14)
AST SERPL-CCNC: 26 U/L — SIGNIFICANT CHANGE UP (ref 4–32)
BASOPHILS # BLD AUTO: 0.03 K/UL — SIGNIFICANT CHANGE UP (ref 0–0.2)
BASOPHILS NFR BLD AUTO: 0.4 % — SIGNIFICANT CHANGE UP (ref 0–2)
BILIRUB SERPL-MCNC: 0.5 MG/DL — SIGNIFICANT CHANGE UP (ref 0.2–1.2)
BUN SERPL-MCNC: 12 MG/DL — SIGNIFICANT CHANGE UP (ref 7–23)
CALCIUM SERPL-MCNC: 8.9 MG/DL — SIGNIFICANT CHANGE UP (ref 8.4–10.5)
CHLORIDE SERPL-SCNC: 97 MMOL/L — LOW (ref 98–107)
CHOLEST SERPL-MCNC: 144 MG/DL — SIGNIFICANT CHANGE UP
CO2 SERPL-SCNC: 24 MMOL/L — SIGNIFICANT CHANGE UP (ref 22–31)
CREAT SERPL-MCNC: 0.73 MG/DL — SIGNIFICANT CHANGE UP (ref 0.5–1.3)
EGFR: 95 ML/MIN/1.73M2 — SIGNIFICANT CHANGE UP
EOSINOPHIL # BLD AUTO: 0.48 K/UL — SIGNIFICANT CHANGE UP (ref 0–0.5)
EOSINOPHIL NFR BLD AUTO: 6.4 % — HIGH (ref 0–6)
ESTIMATED AVERAGE GLUCOSE: 146 — SIGNIFICANT CHANGE UP
GLUCOSE BLDC GLUCOMTR-MCNC: 114 MG/DL — HIGH (ref 70–99)
GLUCOSE BLDC GLUCOMTR-MCNC: 123 MG/DL — HIGH (ref 70–99)
GLUCOSE BLDC GLUCOMTR-MCNC: 195 MG/DL — HIGH (ref 70–99)
GLUCOSE SERPL-MCNC: 106 MG/DL — HIGH (ref 70–99)
HCT VFR BLD CALC: 38.2 % — SIGNIFICANT CHANGE UP (ref 34.5–45)
HDLC SERPL-MCNC: 25 MG/DL — LOW
HGB BLD-MCNC: 12.9 G/DL — SIGNIFICANT CHANGE UP (ref 11.5–15.5)
IANC: 4.98 K/UL — SIGNIFICANT CHANGE UP (ref 1.8–7.4)
IMM GRANULOCYTES NFR BLD AUTO: 4.4 % — HIGH (ref 0–0.9)
LIPID PNL WITH DIRECT LDL SERPL: 78 MG/DL — SIGNIFICANT CHANGE UP
LYMPHOCYTES # BLD AUTO: 1.37 K/UL — SIGNIFICANT CHANGE UP (ref 1–3.3)
LYMPHOCYTES # BLD AUTO: 18.4 % — SIGNIFICANT CHANGE UP (ref 13–44)
MAGNESIUM SERPL-MCNC: 2.1 MG/DL — SIGNIFICANT CHANGE UP (ref 1.6–2.6)
MCHC RBC-ENTMCNC: 29.2 PG — SIGNIFICANT CHANGE UP (ref 27–34)
MCHC RBC-ENTMCNC: 33.8 GM/DL — SIGNIFICANT CHANGE UP (ref 32–36)
MCV RBC AUTO: 86.4 FL — SIGNIFICANT CHANGE UP (ref 80–100)
MONOCYTES # BLD AUTO: 0.26 K/UL — SIGNIFICANT CHANGE UP (ref 0–0.9)
MONOCYTES NFR BLD AUTO: 3.5 % — SIGNIFICANT CHANGE UP (ref 2–14)
NEUTROPHILS # BLD AUTO: 4.98 K/UL — SIGNIFICANT CHANGE UP (ref 1.8–7.4)
NEUTROPHILS NFR BLD AUTO: 66.9 % — SIGNIFICANT CHANGE UP (ref 43–77)
NON HDL CHOLESTEROL: 119 MG/DL — SIGNIFICANT CHANGE UP
NRBC # BLD: 0 /100 WBCS — SIGNIFICANT CHANGE UP (ref 0–0)
NRBC # FLD: 0 K/UL — SIGNIFICANT CHANGE UP (ref 0–0)
PHOSPHATE SERPL-MCNC: 3.3 MG/DL — SIGNIFICANT CHANGE UP (ref 2.5–4.5)
PLATELET # BLD AUTO: 143 K/UL — LOW (ref 150–400)
POTASSIUM SERPL-MCNC: 2.9 MMOL/L — CRITICAL LOW (ref 3.5–5.3)
POTASSIUM SERPL-SCNC: 2.9 MMOL/L — CRITICAL LOW (ref 3.5–5.3)
PROT SERPL-MCNC: 6.8 G/DL — SIGNIFICANT CHANGE UP (ref 6–8.3)
RBC # BLD: 4.42 M/UL — SIGNIFICANT CHANGE UP (ref 3.8–5.2)
RBC # FLD: 12.2 % — SIGNIFICANT CHANGE UP (ref 10.3–14.5)
SODIUM SERPL-SCNC: 137 MMOL/L — SIGNIFICANT CHANGE UP (ref 135–145)
TRIGL SERPL-MCNC: 205 MG/DL — HIGH
WBC # BLD: 7.33 K/UL — SIGNIFICANT CHANGE UP (ref 3.8–10.5)
WBC # FLD AUTO: 7.33 K/UL — SIGNIFICANT CHANGE UP (ref 3.8–10.5)

## 2024-10-07 PROCEDURE — 99232 SBSQ HOSP IP/OBS MODERATE 35: CPT | Mod: GC

## 2024-10-07 RX ORDER — ACETAMINOPHEN 325 MG
1000 TABLET ORAL ONCE
Refills: 0 | Status: COMPLETED | OUTPATIENT
Start: 2024-10-07 | End: 2024-10-07

## 2024-10-07 RX ORDER — ERTAPENEM 1 G/1
1000 INJECTION, POWDER, LYOPHILIZED, FOR SOLUTION INTRAMUSCULAR; INTRAVENOUS EVERY 24 HOURS
Refills: 0 | Status: DISCONTINUED | OUTPATIENT
Start: 2024-10-07 | End: 2024-10-09

## 2024-10-07 RX ADMIN — ENOXAPARIN SODIUM 40 MILLIGRAM(S): 150 INJECTION SUBCUTANEOUS at 18:47

## 2024-10-07 RX ADMIN — Medication 650 MILLIGRAM(S): at 10:34

## 2024-10-07 RX ADMIN — Medication 20 MILLIEQUIVALENT(S): at 07:26

## 2024-10-07 RX ADMIN — Medication 20 MILLIEQUIVALENT(S): at 12:14

## 2024-10-07 RX ADMIN — Medication 650 MILLIGRAM(S): at 17:26

## 2024-10-07 RX ADMIN — Medication 100 MILLIGRAM(S): at 16:14

## 2024-10-07 RX ADMIN — Medication 650 MILLIGRAM(S): at 10:04

## 2024-10-07 RX ADMIN — Medication 650 MILLIGRAM(S): at 16:51

## 2024-10-07 RX ADMIN — Medication 400 MILLIGRAM(S): at 01:54

## 2024-10-07 RX ADMIN — Medication 1000 MILLIGRAM(S): at 02:44

## 2024-10-07 RX ADMIN — Medication 650 MILLIGRAM(S): at 23:06

## 2024-10-07 RX ADMIN — ERTAPENEM 120 MILLIGRAM(S): 1 INJECTION, POWDER, LYOPHILIZED, FOR SOLUTION INTRAMUSCULAR; INTRAVENOUS at 16:14

## 2024-10-07 RX ADMIN — Medication 2 TABLET(S): at 23:06

## 2024-10-07 NOTE — PROGRESS NOTE ADULT - PROBLEM SELECTOR PLAN 1
UTI s/p Ciprofloxacin x 7 days and Macrobid x 5 days. S/p 1g ertapenem in the ED. Urinalysis with turpid urine, protein 30, ketone 15, moderate LE, positive nitrites, 717 WBC, 6 RBC, >1000 glucosuria. Urine culture outpatient on 10/4 only with urogenital payton. Could also be cystitis versus nephrolithiasis.    - f/u ucx  - f/u bcx  - start cefepime 1g q12h UTI s/p Ciprofloxacin x 7 days and Macrobid x 5 days. S/p 1g ertapenem in the ED. Urinalysis with turpid urine, protein 30, ketone 15, moderate LE, positive nitrites, 717 WBC, 6 RBC, >1000 glucosuria. Urine culture outpatient on 10/4 only with urogenital payton. Could also be cystitis versus nephrolithiasis.    - f/u ucx  - f/u bcx  - c/w ertapenem 1g q12h

## 2024-10-07 NOTE — PROGRESS NOTE ADULT - PROBLEM SELECTOR PLAN 6
- DVT prophylaxis: lovenox 40mg daily  - Diet: DASH Diet  - GI ppx:  - Dispo: - DVT prophylaxis: lovenox 40mg daily  - Diet: DASH Diet  - GI ppx:  - Dispo: home

## 2024-10-07 NOTE — PROGRESS NOTE ADULT - PROBLEM SELECTOR PLAN 2
Patient with 25% band neutrophils, no leukocytosis in the setting of continued urinary tract infection    - start cefepime 1g q12h Patient with 25% band neutrophils, no leukocytosis in the setting of continued urinary tract infection    - cw ertapenem 1g q12h

## 2024-10-07 NOTE — PROGRESS NOTE ADULT - SUBJECTIVE AND OBJECTIVE BOX
***************************************************************  Sabrina Trivedi, PGY-1  Internal Medicine   Available on [employer] TEAMS  ***************************************************************    NIKHIL SWEET  59y  MRN: [id number]    Patient is a 59y old  Female who presents with a chief complaint of UTI + bandemia (06 Oct 2024 17:55)      Interval/Overnight Events: no events ON.     Subjective: Pt seen and examined at bedside. Denies fever, CP, SOB, abn pain, N/V, dysuria. Tolerating diet.      MEDICATIONS  (STANDING):  cefepime   IVPB 1000 milliGRAM(s) IV Intermittent every 12 hours  dextrose 5%. 1000 milliLiter(s) (50 mL/Hr) IV Continuous <Continuous>  dextrose 5%. 1000 milliLiter(s) (100 mL/Hr) IV Continuous <Continuous>  dextrose 50% Injectable 25 Gram(s) IV Push once  dextrose 50% Injectable 12.5 Gram(s) IV Push once  dextrose 50% Injectable 25 Gram(s) IV Push once  enoxaparin Injectable 40 milliGRAM(s) SubCutaneous every 24 hours  glucagon  Injectable 1 milliGRAM(s) IntraMuscular once  insulin lispro (ADMELOG) corrective regimen sliding scale   SubCutaneous at bedtime  insulin lispro (ADMELOG) corrective regimen sliding scale   SubCutaneous three times a day before meals  potassium chloride    Tablet ER 20 milliEquivalent(s) Oral every 2 hours  senna 2 Tablet(s) Oral at bedtime    MEDICATIONS  (PRN):  acetaminophen     Tablet .. 650 milliGRAM(s) Oral every 6 hours PRN Temp greater or equal to 38C (100.4F), Mild Pain (1 - 3)  dextrose Oral Gel 15 Gram(s) Oral once PRN Blood Glucose LESS THAN 70 milliGRAM(s)/deciliter  melatonin 3 milliGRAM(s) Oral at bedtime PRN Insomnia  phenazopyridine 100 milliGRAM(s) Oral every 8 hours PRN dysuria      Objective:    Vitals: Vital Signs Last 24 Hrs  T(C): 37.2 (10-07-24 @ 04:58), Max: 39.5 (10-07-24 @ 01:41)  T(F): 99 (10-07-24 @ 04:58), Max: 103.1 (10-07-24 @ 01:41)  HR: 87 (10-07-24 @ 04:58) (83 - 108)  BP: 102/64 (10-07-24 @ 04:58) (101/55 - 120/64)  BP(mean): --  RR: 17 (10-07-24 @ 04:58) (16 - 18)  SpO2: 96% (10-07-24 @ 04:58) (95% - 100%)                I&O's Summary    06 Oct 2024 07:01  -  07 Oct 2024 07:00  --------------------------------------------------------  IN: 0 mL / OUT: 200 mL / NET: -200 mL        PHYSICAL EXAM:  GENERAL: NAD  HEAD:  Atraumatic, Normocephalic  EYES: EOMI, conjunctiva and sclera clear  CHEST/LUNG: Clear to auscultation bilaterally; No rales, rhonchi, wheezing, or rubs  HEART: Regular rate and rhythm; No murmurs, rubs, or gallops  ABDOMEN: Soft, Nontender, Nondistended;   SKIN: No rashes or lesions  NERVOUS SYSTEM:  Alert & Oriented X3, no focal deficits    LABS:                        12.9   7.33  )-----------( 143      ( 07 Oct 2024 06:19 )             38.2                         13.9   8.84  )-----------( 179      ( 06 Oct 2024 14:06 )             40.0     10-07    137  |  97[L]  |  12  ----------------------------<  106[H]  2.9[LL]   |  24  |  0.73  10-06    135  |  92[L]  |  22  ----------------------------<  155[H]  3.3[L]   |  23  |  0.94    Ca    8.9      07 Oct 2024 06:19  Ca    9.2      06 Oct 2024 14:06  Phos  3.3     10-07  Mg     2.10     10-07    TPro  6.8  /  Alb  3.9  /  TBili  0.5  /  DBili  x   /  AST  26  /  ALT  23  /  AlkPhos  68  10-07  TPro  7.3  /  Alb  4.4  /  TBili  0.7  /  DBili  x   /  AST  26  /  ALT  25  /  AlkPhos  74  10-06    CAPILLARY BLOOD GLUCOSE      POCT Blood Glucose.: 93 mg/dL (07 Oct 2024 08:26)  POCT Blood Glucose.: 194 mg/dL (06 Oct 2024 21:14)  POCT Blood Glucose.: 113 mg/dL (06 Oct 2024 16:18)  POCT Blood Glucose.: 159 mg/dL (06 Oct 2024 13:13)        Urinalysis Basic - ( 07 Oct 2024 06:19 )    Color: x / Appearance: x / SG: x / pH: x  Gluc: 106 mg/dL / Ketone: x  / Bili: x / Urobili: x   Blood: x / Protein: x / Nitrite: x   Leuk Esterase: x / RBC: x / WBC x   Sq Epi: x / Non Sq Epi: x / Bacteria: x          RADIOLOGY & ADDITIONAL TESTS:         ***************************************************************  Sabrina Trivedi, PGY-1  Internal Medicine   Available on [employer] TEAMS  ***************************************************************    NIKHIL SWEET  59y  MRN: [id number]    Patient is a 59y old  Female who presents with a chief complaint of UTI + bandemia (06 Oct 2024 17:55)      Interval/Overnight Events: Febrile overnight to 103. Patient was given IV tylenol.     Subjective: Pt seen and examined at bedside. Reports that her abdominal pain and dysuria have improved. Has mild headache. Denies CP, SOB, N/V, dysuria. Tolerating diet.      MEDICATIONS  (STANDING):  cefepime   IVPB 1000 milliGRAM(s) IV Intermittent every 12 hours  dextrose 5%. 1000 milliLiter(s) (50 mL/Hr) IV Continuous <Continuous>  dextrose 5%. 1000 milliLiter(s) (100 mL/Hr) IV Continuous <Continuous>  dextrose 50% Injectable 25 Gram(s) IV Push once  dextrose 50% Injectable 12.5 Gram(s) IV Push once  dextrose 50% Injectable 25 Gram(s) IV Push once  enoxaparin Injectable 40 milliGRAM(s) SubCutaneous every 24 hours  glucagon  Injectable 1 milliGRAM(s) IntraMuscular once  insulin lispro (ADMELOG) corrective regimen sliding scale   SubCutaneous at bedtime  insulin lispro (ADMELOG) corrective regimen sliding scale   SubCutaneous three times a day before meals  potassium chloride    Tablet ER 20 milliEquivalent(s) Oral every 2 hours  senna 2 Tablet(s) Oral at bedtime    MEDICATIONS  (PRN):  acetaminophen     Tablet .. 650 milliGRAM(s) Oral every 6 hours PRN Temp greater or equal to 38C (100.4F), Mild Pain (1 - 3)  dextrose Oral Gel 15 Gram(s) Oral once PRN Blood Glucose LESS THAN 70 milliGRAM(s)/deciliter  melatonin 3 milliGRAM(s) Oral at bedtime PRN Insomnia  phenazopyridine 100 milliGRAM(s) Oral every 8 hours PRN dysuria      Objective:    Vitals: Vital Signs Last 24 Hrs  T(C): 37.2 (10-07-24 @ 04:58), Max: 39.5 (10-07-24 @ 01:41)  T(F): 99 (10-07-24 @ 04:58), Max: 103.1 (10-07-24 @ 01:41)  HR: 87 (10-07-24 @ 04:58) (83 - 108)  BP: 102/64 (10-07-24 @ 04:58) (101/55 - 120/64)  BP(mean): --  RR: 17 (10-07-24 @ 04:58) (16 - 18)  SpO2: 96% (10-07-24 @ 04:58) (95% - 100%)                I&O's Summary    06 Oct 2024 07:01  -  07 Oct 2024 07:00  --------------------------------------------------------  IN: 0 mL / OUT: 200 mL / NET: -200 mL        PHYSICAL EXAM:  GENERAL: NAD  HEAD:  Atraumatic, Normocephalic  EYES: EOMI, conjunctiva and sclera clear  CHEST/LUNG: Clear to auscultation bilaterally; No rales, rhonchi, wheezing, or rubs  HEART: Regular rate and rhythm; No murmurs, rubs, or gallops  ABDOMEN: +mild suprapubic TTP  SKIN: No rashes or lesions  NERVOUS SYSTEM:  Alert & Oriented X3, no focal deficits    LABS:                        12.9   7.33  )-----------( 143      ( 07 Oct 2024 06:19 )             38.2                         13.9   8.84  )-----------( 179      ( 06 Oct 2024 14:06 )             40.0     10-07    137  |  97[L]  |  12  ----------------------------<  106[H]  2.9[LL]   |  24  |  0.73  10-06    135  |  92[L]  |  22  ----------------------------<  155[H]  3.3[L]   |  23  |  0.94    Ca    8.9      07 Oct 2024 06:19  Ca    9.2      06 Oct 2024 14:06  Phos  3.3     10-07  Mg     2.10     10-07    TPro  6.8  /  Alb  3.9  /  TBili  0.5  /  DBili  x   /  AST  26  /  ALT  23  /  AlkPhos  68  10-07  TPro  7.3  /  Alb  4.4  /  TBili  0.7  /  DBili  x   /  AST  26  /  ALT  25  /  AlkPhos  74  10-06    CAPILLARY BLOOD GLUCOSE      POCT Blood Glucose.: 93 mg/dL (07 Oct 2024 08:26)  POCT Blood Glucose.: 194 mg/dL (06 Oct 2024 21:14)  POCT Blood Glucose.: 113 mg/dL (06 Oct 2024 16:18)  POCT Blood Glucose.: 159 mg/dL (06 Oct 2024 13:13)        Urinalysis Basic - ( 07 Oct 2024 06:19 )    Color: x / Appearance: x / SG: x / pH: x  Gluc: 106 mg/dL / Ketone: x  / Bili: x / Urobili: x   Blood: x / Protein: x / Nitrite: x   Leuk Esterase: x / RBC: x / WBC x   Sq Epi: x / Non Sq Epi: x / Bacteria: x          RADIOLOGY & ADDITIONAL TESTS:

## 2024-10-07 NOTE — PROGRESS NOTE ADULT - PROBLEM SELECTOR PLAN 3
Cholelithiasis found incidentally on CTAP, no evidence of cholecystitis. Patient has no RUQ tenderness or biliary cholic. Afebrile, no leukocytosis, tbili 0.7. Afebrile, no GERD, no Hess's sign.    - no leukocytosis  - CTM

## 2024-10-07 NOTE — PROGRESS NOTE ADULT - PROBLEM SELECTOR PLAN 4
A1C:    - low SS inpatient  - FS TIDAC  - hold home metformin 1000 BID, farxiga 10mg daily, glyburide 1mg daily, trulicity 4.5mg weekly  - f/u lipid panel

## 2024-10-07 NOTE — PROGRESS NOTE ADULT - ASSESSMENT
59-year-old female with DM, HTN,  presented to the ED complaining having lower abdominal pressure and pain for last few weeks. Failed both macrobid and cipro, here for lack of resolution of urinary symptoms. 59-year-old female with DM, HTN,  presented to the ED complaining having lower abdominal pressure and pain for last few weeks. Failed both macrobid and cipro, here for lack of resolution of urinary symptoms, now on ertapenem 1g daily x 7 days.

## 2024-10-08 ENCOUNTER — RESULT REVIEW (OUTPATIENT)
Age: 59
End: 2024-10-08

## 2024-10-08 DIAGNOSIS — R50.9 FEVER, UNSPECIFIED: ICD-10-CM

## 2024-10-08 LAB
ALBUMIN SERPL ELPH-MCNC: 3.7 G/DL — SIGNIFICANT CHANGE UP (ref 3.3–5)
ALP SERPL-CCNC: 65 U/L — SIGNIFICANT CHANGE UP (ref 40–120)
ALT FLD-CCNC: 22 U/L — SIGNIFICANT CHANGE UP (ref 4–33)
ANION GAP SERPL CALC-SCNC: 13 MMOL/L — SIGNIFICANT CHANGE UP (ref 7–14)
AST SERPL-CCNC: 20 U/L — SIGNIFICANT CHANGE UP (ref 4–32)
B MICROTI DNA BLD QL NAA+PROBE: SIGNIFICANT CHANGE UP
BABESIA 18S RRNA BLD QL NAA+PROBE: SIGNIFICANT CHANGE UP
BASOPHILS # BLD AUTO: 0.04 K/UL — SIGNIFICANT CHANGE UP (ref 0–0.2)
BASOPHILS NFR BLD AUTO: 0.5 % — SIGNIFICANT CHANGE UP (ref 0–2)
BILIRUB SERPL-MCNC: 0.5 MG/DL — SIGNIFICANT CHANGE UP (ref 0.2–1.2)
BUN SERPL-MCNC: 12 MG/DL — SIGNIFICANT CHANGE UP (ref 7–23)
CALCIUM SERPL-MCNC: 8.5 MG/DL — SIGNIFICANT CHANGE UP (ref 8.4–10.5)
CHLORIDE SERPL-SCNC: 101 MMOL/L — SIGNIFICANT CHANGE UP (ref 98–107)
CO2 SERPL-SCNC: 23 MMOL/L — SIGNIFICANT CHANGE UP (ref 22–31)
CREAT SERPL-MCNC: 0.61 MG/DL — SIGNIFICANT CHANGE UP (ref 0.5–1.3)
EGFR: 103 ML/MIN/1.73M2 — SIGNIFICANT CHANGE UP
EOSINOPHIL # BLD AUTO: 0.52 K/UL — HIGH (ref 0–0.5)
EOSINOPHIL NFR BLD AUTO: 6.5 % — HIGH (ref 0–6)
GLUCOSE BLDC GLUCOMTR-MCNC: 134 MG/DL — HIGH (ref 70–99)
GLUCOSE BLDC GLUCOMTR-MCNC: 212 MG/DL — HIGH (ref 70–99)
GLUCOSE BLDC GLUCOMTR-MCNC: 215 MG/DL — HIGH (ref 70–99)
GLUCOSE BLDC GLUCOMTR-MCNC: 258 MG/DL — HIGH (ref 70–99)
GLUCOSE SERPL-MCNC: 133 MG/DL — HIGH (ref 70–99)
HCT VFR BLD CALC: 36.2 % — SIGNIFICANT CHANGE UP (ref 34.5–45)
HGB BLD-MCNC: 12.4 G/DL — SIGNIFICANT CHANGE UP (ref 11.5–15.5)
IANC: 4.58 K/UL — SIGNIFICANT CHANGE UP (ref 1.8–7.4)
IMM GRANULOCYTES NFR BLD AUTO: 2.4 % — HIGH (ref 0–0.9)
LYMPHOCYTES # BLD AUTO: 2.32 K/UL — SIGNIFICANT CHANGE UP (ref 1–3.3)
LYMPHOCYTES # BLD AUTO: 28.8 % — SIGNIFICANT CHANGE UP (ref 13–44)
MAGNESIUM SERPL-MCNC: 2.3 MG/DL — SIGNIFICANT CHANGE UP (ref 1.6–2.6)
MCHC RBC-ENTMCNC: 29.2 PG — SIGNIFICANT CHANGE UP (ref 27–34)
MCHC RBC-ENTMCNC: 34.3 GM/DL — SIGNIFICANT CHANGE UP (ref 32–36)
MCV RBC AUTO: 85.2 FL — SIGNIFICANT CHANGE UP (ref 80–100)
MONOCYTES # BLD AUTO: 0.41 K/UL — SIGNIFICANT CHANGE UP (ref 0–0.9)
MONOCYTES NFR BLD AUTO: 5.1 % — SIGNIFICANT CHANGE UP (ref 2–14)
NEUTROPHILS # BLD AUTO: 4.58 K/UL — SIGNIFICANT CHANGE UP (ref 1.8–7.4)
NEUTROPHILS NFR BLD AUTO: 56.7 % — SIGNIFICANT CHANGE UP (ref 43–77)
NRBC # BLD: 0 /100 WBCS — SIGNIFICANT CHANGE UP (ref 0–0)
NRBC # FLD: 0 K/UL — SIGNIFICANT CHANGE UP (ref 0–0)
PHOSPHATE SERPL-MCNC: 2.6 MG/DL — SIGNIFICANT CHANGE UP (ref 2.5–4.5)
PLATELET # BLD AUTO: 144 K/UL — LOW (ref 150–400)
POTASSIUM SERPL-MCNC: 3 MMOL/L — LOW (ref 3.5–5.3)
POTASSIUM SERPL-SCNC: 3 MMOL/L — LOW (ref 3.5–5.3)
PROT SERPL-MCNC: 6.3 G/DL — SIGNIFICANT CHANGE UP (ref 6–8.3)
RBC # BLD: 4.25 M/UL — SIGNIFICANT CHANGE UP (ref 3.8–5.2)
RBC # FLD: 12.2 % — SIGNIFICANT CHANGE UP (ref 10.3–14.5)
SODIUM SERPL-SCNC: 137 MMOL/L — SIGNIFICANT CHANGE UP (ref 135–145)
WBC # BLD: 8.06 K/UL — SIGNIFICANT CHANGE UP (ref 3.8–10.5)
WBC # FLD AUTO: 8.06 K/UL — SIGNIFICANT CHANGE UP (ref 3.8–10.5)

## 2024-10-08 PROCEDURE — 93356 MYOCRD STRAIN IMG SPCKL TRCK: CPT

## 2024-10-08 PROCEDURE — 99232 SBSQ HOSP IP/OBS MODERATE 35: CPT | Mod: GC

## 2024-10-08 PROCEDURE — 93306 TTE W/DOPPLER COMPLETE: CPT | Mod: 26

## 2024-10-08 RX ORDER — ACETAMINOPHEN 325 MG
1000 TABLET ORAL EVERY 8 HOURS
Refills: 0 | Status: DISCONTINUED | OUTPATIENT
Start: 2024-10-08 | End: 2024-10-09

## 2024-10-08 RX ORDER — ACETAMINOPHEN 325 MG
1000 TABLET ORAL ONCE
Refills: 0 | Status: COMPLETED | OUTPATIENT
Start: 2024-10-08 | End: 2024-10-08

## 2024-10-08 RX ADMIN — Medication 650 MILLIGRAM(S): at 00:55

## 2024-10-08 RX ADMIN — Medication 2: at 12:33

## 2024-10-08 RX ADMIN — Medication 1000 MILLIGRAM(S): at 04:00

## 2024-10-08 RX ADMIN — Medication 400 MILLIGRAM(S): at 03:34

## 2024-10-08 RX ADMIN — Medication 2: at 17:57

## 2024-10-08 RX ADMIN — Medication 650 MILLIGRAM(S): at 13:03

## 2024-10-08 RX ADMIN — Medication 40 MILLIEQUIVALENT(S): at 09:29

## 2024-10-08 RX ADMIN — ERTAPENEM 120 MILLIGRAM(S): 1 INJECTION, POWDER, LYOPHILIZED, FOR SOLUTION INTRAMUSCULAR; INTRAVENOUS at 18:02

## 2024-10-08 RX ADMIN — Medication 1: at 22:13

## 2024-10-08 RX ADMIN — Medication 650 MILLIGRAM(S): at 12:33

## 2024-10-08 NOTE — PROGRESS NOTE ADULT - ATTENDING COMMENTS
59 yr old female with a pmh of HTN, T2DM not on insulin, HLD not on statin, who presents with ~3 week hx of lower abdominal pressure, pain on urinary that has not resolved after a 5 day course of nitrofurantoin and a 7 day course of ciprofloxacin. She took Azo to help with the pain/dyscomfort with relief.   Endorsing mild constipation   # UTI, sepsis as bandemia   C/w Ertapenem   F/w cultures   IVF  # DM- DC SGLT2 inhibitors as associated with UTI  HBA1C is 7   F/S Insulin titration inpatient     Rest per resident notes
59 yr old female with a pmh of HTN, T2DM not on insulin, HLD not on statin, who presents with ~3 week hx of lower abdominal pressure, pain on urinary that has not resolved after a 5 day course of nitrofurantoin and a 7 day course of ciprofloxacin. She took Azo to help with the pain/dyscomfort with relief. Now with cyclic fever.        # UTI, sepsis as bandemia   C/w Ertapenem   F/w cultures   IVF  As fever, see repeat cultures  Check lyme atb, babesia PS     # DM- DC SGLT2 inhibitors as associated with UTI  HBA1C is 7   F/S Insulin titration inpatient     Rest per resident notes .

## 2024-10-08 NOTE — PROGRESS NOTE ADULT - PROBLEM SELECTOR PLAN 1
UTI s/p Ciprofloxacin x 7 days and Macrobid x 5 days. S/p 1g ertapenem in the ED. Urinalysis with turpid urine, protein 30, ketone 15, moderate LE, positive nitrites, 717 WBC, 6 RBC, >1000 glucosuria. Urine culture outpatient on 10/4 only with urogenital payton. Could also be cystitis versus nephrolithiasis.    - f/u ucx  - f/u bcx  - c/w ertapenem 1g q12h UTI s/p Ciprofloxacin x 7 days and Macrobid x 5 days. S/p 1g ertapenem in the ED. Urinalysis with turpid urine, protein 30, ketone 15, moderate LE, positive nitrites, 717 WBC, 6 RBC, >1000 glucosuria. Urine culture outpatient on 10/4 only with urogenital payton.   UCx 10/6 with normal urogenital payton    - bcx NGTD  - c/w ertapenem 1g q12h (10/7-10/13)

## 2024-10-08 NOTE — PROGRESS NOTE ADULT - PROBLEM SELECTOR PLAN 3
Cholelithiasis found incidentally on CTAP, no evidence of cholecystitis. Patient has no RUQ tenderness or biliary cholic. Afebrile, no leukocytosis, tbili 0.7. Afebrile, no GERD, no Hess's sign.    - no leukocytosis  - CTM sepsis on admission 2/2 UTI with 25% band neutrophils, no leukocytosis in the setting of continued urinary tract infection    - cw ertapenem 1g q12h (10/7-10/13)

## 2024-10-08 NOTE — PROGRESS NOTE ADULT - PROBLEM SELECTOR PLAN 5
Normotensive on admission    - hold home losartan 50mg daily A1C:    - low SS inpatient  - FS TIDAC  - hold home metformin 1000 BID, farxiga 10mg daily, glyburide 1mg daily, trulicity 4.5mg weekly  - f/u lipid panel

## 2024-10-08 NOTE — PROGRESS NOTE ADULT - PROBLEM SELECTOR PROBLEM 4
Dr Victor Manuel Tamayo at bedside     Tresa Manley RN  11/29/19 2038 T2DM (type 2 diabetes mellitus) Cholelithiasis

## 2024-10-08 NOTE — PROGRESS NOTE ADULT - PROBLEM SELECTOR PLAN 4
A1C:    - low SS inpatient  - FS TIDAC  - hold home metformin 1000 BID, farxiga 10mg daily, glyburide 1mg daily, trulicity 4.5mg weekly  - f/u lipid panel Cholelithiasis found incidentally on CTAP, no evidence of cholecystitis. Patient has no RUQ tenderness or biliary cholic. Afebrile, no leukocytosis, tbili 0.7. Afebrile, no GERD, no Hess's sign.    - no leukocytosis  - CTM

## 2024-10-08 NOTE — PROGRESS NOTE ADULT - PROBLEM SELECTOR PLAN 2
Patient with 25% band neutrophils, no leukocytosis in the setting of continued urinary tract infection    - cw ertapenem 1g q12h Patient with nightly fevers controlled with tylenol. Dysuria and abdominal pain worsen at night as well. CTAP with no abscess or pyelonephritis. Patient has no history of recent travel, camping, hiking in the woods.     - f/u TTE  - cw ertapenem  - Bcx ngtd  - f/u babesia, lyme, anaplasmosis, RF, mora

## 2024-10-08 NOTE — PROGRESS NOTE ADULT - SUBJECTIVE AND OBJECTIVE BOX
***************************************************************  Sabrina Trivedi, PGY-1  Internal Medicine   Available on [employer] TEAMS  ***************************************************************    NIKHIL SWEET  59y  MRN: [id number]    Patient is a 59y old  Female who presents with a chief complaint of UTI + bandemia (07 Oct 2024 08:37)      Interval/Overnight Events: no events ON.     Subjective: Pt seen and examined at bedside. Denies fever, CP, SOB, abn pain, N/V, dysuria. Tolerating diet.      MEDICATIONS  (STANDING):  dextrose 5%. 1000 milliLiter(s) (50 mL/Hr) IV Continuous <Continuous>  dextrose 5%. 1000 milliLiter(s) (100 mL/Hr) IV Continuous <Continuous>  dextrose 50% Injectable 25 Gram(s) IV Push once  dextrose 50% Injectable 25 Gram(s) IV Push once  dextrose 50% Injectable 12.5 Gram(s) IV Push once  enoxaparin Injectable 40 milliGRAM(s) SubCutaneous every 24 hours  ertapenem  IVPB 1000 milliGRAM(s) IV Intermittent every 24 hours  glucagon  Injectable 1 milliGRAM(s) IntraMuscular once  insulin lispro (ADMELOG) corrective regimen sliding scale   SubCutaneous three times a day before meals  insulin lispro (ADMELOG) corrective regimen sliding scale   SubCutaneous at bedtime  senna 2 Tablet(s) Oral at bedtime    MEDICATIONS  (PRN):  acetaminophen     Tablet .. 650 milliGRAM(s) Oral every 6 hours PRN Temp greater or equal to 38C (100.4F), Mild Pain (1 - 3)  dextrose Oral Gel 15 Gram(s) Oral once PRN Blood Glucose LESS THAN 70 milliGRAM(s)/deciliter  melatonin 3 milliGRAM(s) Oral at bedtime PRN Insomnia  phenazopyridine 100 milliGRAM(s) Oral every 8 hours PRN dysuria      Objective:    Vitals: Vital Signs Last 24 Hrs  T(C): 36.6 (10-08-24 @ 06:00), Max: 39.3 (10-08-24 @ 00:30)  T(F): 97.8 (10-08-24 @ 06:00), Max: 102.8 (10-08-24 @ 00:30)  HR: 87 (10-08-24 @ 06:00) (84 - 101)  BP: 96/56 (10-08-24 @ 06:00) (94/59 - 115/63)  BP(mean): --  RR: 18 (10-08-24 @ 06:00) (17 - 18)  SpO2: 96% (10-08-24 @ 06:00) (95% - 100%)                I&O's Summary    07 Oct 2024 07:01  -  08 Oct 2024 07:00  --------------------------------------------------------  IN: 680 mL / OUT: 0 mL / NET: 680 mL        PHYSICAL EXAM:  GENERAL: NAD  HEAD:  Atraumatic, Normocephalic  EYES: EOMI, conjunctiva and sclera clear  CHEST/LUNG: Clear to auscultation bilaterally; No rales, rhonchi, wheezing, or rubs  HEART: Regular rate and rhythm; No murmurs, rubs, or gallops  ABDOMEN: Soft, Nontender, Nondistended;   SKIN: No rashes or lesions  NERVOUS SYSTEM:  Alert & Oriented X3, no focal deficits    LABS:                        12.4   8.06  )-----------( 144      ( 08 Oct 2024 06:39 )             36.2                         12.9   7.33  )-----------( 143      ( 07 Oct 2024 06:19 )             38.2                         13.9   8.84  )-----------( 179      ( 06 Oct 2024 14:06 )             40.0     10-07    137  |  97[L]  |  12  ----------------------------<  106[H]  2.9[LL]   |  24  |  0.73  10-06    135  |  92[L]  |  22  ----------------------------<  155[H]  3.3[L]   |  23  |  0.94    Ca    8.9      07 Oct 2024 06:19  Ca    9.2      06 Oct 2024 14:06  Phos  3.3     10-07  Mg     2.10     10-07    TPro  6.8  /  Alb  3.9  /  TBili  0.5  /  DBili  x   /  AST  26  /  ALT  23  /  AlkPhos  68  10-07  TPro  7.3  /  Alb  4.4  /  TBili  0.7  /  DBili  x   /  AST  26  /  ALT  25  /  AlkPhos  74  10-06    CAPILLARY BLOOD GLUCOSE      POCT Blood Glucose.: 195 mg/dL (07 Oct 2024 21:36)  POCT Blood Glucose.: 123 mg/dL (07 Oct 2024 18:03)  POCT Blood Glucose.: 114 mg/dL (07 Oct 2024 11:51)  POCT Blood Glucose.: 93 mg/dL (07 Oct 2024 08:26)        Urinalysis Basic - ( 07 Oct 2024 06:19 )    Color: x / Appearance: x / SG: x / pH: x  Gluc: 106 mg/dL / Ketone: x  / Bili: x / Urobili: x   Blood: x / Protein: x / Nitrite: x   Leuk Esterase: x / RBC: x / WBC x   Sq Epi: x / Non Sq Epi: x / Bacteria: x          RADIOLOGY & ADDITIONAL TESTS:         ***************************************************************  Sabrina Trivedi, PGY-1  Internal Medicine   Available on [employer] TEAMS  ***************************************************************    NIKHIL SWEET  59y  MRN: [id number]    Patient is a 59y old  Female who presents with a chief complaint of UTI + bandemia (07 Oct 2024 08:37)      Interval/Overnight Events:    - Patient was febrile to 102.8 overnight, given IV tylenol and bcx drawn    Subjective: Pt seen and examined at bedside. Reports her abdominal pain, dysuria, and fever return cyclically every evening. No abdominal pain, dysuria, nausea, vomiting, chest pain, sob, palpitations.      MEDICATIONS  (STANDING):  dextrose 5%. 1000 milliLiter(s) (50 mL/Hr) IV Continuous <Continuous>  dextrose 5%. 1000 milliLiter(s) (100 mL/Hr) IV Continuous <Continuous>  dextrose 50% Injectable 25 Gram(s) IV Push once  dextrose 50% Injectable 25 Gram(s) IV Push once  dextrose 50% Injectable 12.5 Gram(s) IV Push once  enoxaparin Injectable 40 milliGRAM(s) SubCutaneous every 24 hours  ertapenem  IVPB 1000 milliGRAM(s) IV Intermittent every 24 hours  glucagon  Injectable 1 milliGRAM(s) IntraMuscular once  insulin lispro (ADMELOG) corrective regimen sliding scale   SubCutaneous three times a day before meals  insulin lispro (ADMELOG) corrective regimen sliding scale   SubCutaneous at bedtime  senna 2 Tablet(s) Oral at bedtime    MEDICATIONS  (PRN):  acetaminophen     Tablet .. 650 milliGRAM(s) Oral every 6 hours PRN Temp greater or equal to 38C (100.4F), Mild Pain (1 - 3)  dextrose Oral Gel 15 Gram(s) Oral once PRN Blood Glucose LESS THAN 70 milliGRAM(s)/deciliter  melatonin 3 milliGRAM(s) Oral at bedtime PRN Insomnia  phenazopyridine 100 milliGRAM(s) Oral every 8 hours PRN dysuria      Objective:    Vitals: Vital Signs Last 24 Hrs  T(C): 36.6 (10-08-24 @ 06:00), Max: 39.3 (10-08-24 @ 00:30)  T(F): 97.8 (10-08-24 @ 06:00), Max: 102.8 (10-08-24 @ 00:30)  HR: 87 (10-08-24 @ 06:00) (84 - 101)  BP: 96/56 (10-08-24 @ 06:00) (94/59 - 115/63)  BP(mean): --  RR: 18 (10-08-24 @ 06:00) (17 - 18)  SpO2: 96% (10-08-24 @ 06:00) (95% - 100%)                I&O's Summary    07 Oct 2024 07:01  -  08 Oct 2024 07:00  --------------------------------------------------------  IN: 680 mL / OUT: 0 mL / NET: 680 mL        PHYSICAL EXAM:  GENERAL: NAD  HEAD:  Atraumatic, Normocephalic  EYES: EOMI, conjunctiva and sclera clear  CHEST/LUNG: Clear to auscultation bilaterally; No rales, rhonchi, wheezing, or rubs  HEART: Regular rate and rhythm; No murmurs, rubs, or gallops  ABDOMEN: +mild suprapubic tenderness to palpation  SKIN: No rashes or lesions  NERVOUS SYSTEM:  Alert & Oriented X3, no focal deficits    LABS:                        12.4   8.06  )-----------( 144      ( 08 Oct 2024 06:39 )             36.2                         12.9   7.33  )-----------( 143      ( 07 Oct 2024 06:19 )             38.2                         13.9   8.84  )-----------( 179      ( 06 Oct 2024 14:06 )             40.0     10-07    137  |  97[L]  |  12  ----------------------------<  106[H]  2.9[LL]   |  24  |  0.73  10-06    135  |  92[L]  |  22  ----------------------------<  155[H]  3.3[L]   |  23  |  0.94    Ca    8.9      07 Oct 2024 06:19  Ca    9.2      06 Oct 2024 14:06  Phos  3.3     10-07  Mg     2.10     10-07    TPro  6.8  /  Alb  3.9  /  TBili  0.5  /  DBili  x   /  AST  26  /  ALT  23  /  AlkPhos  68  10-07  TPro  7.3  /  Alb  4.4  /  TBili  0.7  /  DBili  x   /  AST  26  /  ALT  25  /  AlkPhos  74  10-06    CAPILLARY BLOOD GLUCOSE      POCT Blood Glucose.: 195 mg/dL (07 Oct 2024 21:36)  POCT Blood Glucose.: 123 mg/dL (07 Oct 2024 18:03)  POCT Blood Glucose.: 114 mg/dL (07 Oct 2024 11:51)  POCT Blood Glucose.: 93 mg/dL (07 Oct 2024 08:26)        Urinalysis Basic - ( 07 Oct 2024 06:19 )    Color: x / Appearance: x / SG: x / pH: x  Gluc: 106 mg/dL / Ketone: x  / Bili: x / Urobili: x   Blood: x / Protein: x / Nitrite: x   Leuk Esterase: x / RBC: x / WBC x   Sq Epi: x / Non Sq Epi: x / Bacteria: x          RADIOLOGY & ADDITIONAL TESTS:

## 2024-10-08 NOTE — PROGRESS NOTE ADULT - PROBLEM SELECTOR PLAN 6
- DVT prophylaxis: lovenox 40mg daily  - Diet: DASH Diet  - GI ppx:  - Dispo: home Normotensive on admission    - hold home losartan 50mg daily

## 2024-10-08 NOTE — PROGRESS NOTE ADULT - ASSESSMENT
59-year-old female with DM, HTN,  presented to the ED complaining having lower abdominal pressure and pain for last few weeks. Failed both macrobid and cipro, here for lack of resolution of urinary symptoms, now on ertapenem 1g daily x 7 days.

## 2024-10-09 ENCOUNTER — TRANSCRIPTION ENCOUNTER (OUTPATIENT)
Age: 59
End: 2024-10-09

## 2024-10-09 VITALS
HEART RATE: 90 BPM | SYSTOLIC BLOOD PRESSURE: 117 MMHG | TEMPERATURE: 98 F | RESPIRATION RATE: 18 BRPM | OXYGEN SATURATION: 98 % | DIASTOLIC BLOOD PRESSURE: 77 MMHG

## 2024-10-09 LAB
ANA TITR SER: NEGATIVE — SIGNIFICANT CHANGE UP
B BURGDOR C6 AB SER-ACNC: NEGATIVE — SIGNIFICANT CHANGE UP
B BURGDOR IGG+IGM SER-ACNC: 0.16 INDEX — SIGNIFICANT CHANGE UP (ref 0.01–0.9)
GLUCOSE BLDC GLUCOMTR-MCNC: 183 MG/DL — HIGH (ref 70–99)
GLUCOSE BLDC GLUCOMTR-MCNC: 228 MG/DL — HIGH (ref 70–99)

## 2024-10-09 PROCEDURE — 99239 HOSP IP/OBS DSCHRG MGMT >30: CPT | Mod: GC

## 2024-10-09 RX ORDER — LOSARTAN POTASSIUM AND HYDROCHLOROTHIAZIDE 100; 12.5 MG/1; MG/1
1 TABLET, FILM COATED ORAL
Refills: 0 | DISCHARGE

## 2024-10-09 RX ORDER — DAPAGLIFLOZIN 10 MG/1
1 TABLET, FILM COATED ORAL
Refills: 0 | DISCHARGE

## 2024-10-09 RX ADMIN — Medication 1: at 09:02

## 2024-10-09 RX ADMIN — Medication 2: at 12:42

## 2024-10-09 NOTE — DISCHARGE NOTE NURSING/CASE MANAGEMENT/SOCIAL WORK - NSDCPEPTCAREGIVEDUMATLIST _GEN_ALL_CORE
Patient: Pat Nowak    Procedure Summary     Date:  04/12/19 Room / Location:   [facility]    Anesthesia Start:  1150 Anesthesia Stop:  1213    Procedure:  BRONCHOSCOPY (Bilateral Bronchus) Diagnosis:       Interstitial lung disease (CMS/HCC)      (Interstitial lung disease (CMS/HCC) [J84.9])    Surgeon:  Jeff Laura MD Provider:  Oneil Padilla MD    Anesthesia Type:  MAC ASA Status:  3          Anesthesia Type: MAC  Last vitals  BP   142/79   Temp   97.8 °F (36.6 °C) (04/12/19 0954)   Pulse 84   Resp 18   SpO2 99%     Post Anesthesia Care and Evaluation    Patient location during evaluation: PACU  Patient participation: complete - patient participated  Level of consciousness: awake and alert  Pain score: 0  Pain management: adequate  Airway patency: patent  Anesthetic complications: No anesthetic complications  PONV Status: none  Cardiovascular status: hemodynamically stable and acceptable  Respiratory status: nonlabored ventilation, acceptable and nasal cannula  Hydration status: acceptable       Diabetes/Influenza Vaccination

## 2024-10-09 NOTE — PROGRESS NOTE ADULT - PROBLEM SELECTOR PLAN 7
- DVT prophylaxis: lovenox 40mg daily  - Diet: DASH Diet  - GI ppx:  - Dispo: home
- DVT prophylaxis: lovenox 40mg daily  - Diet: DASH Diet  - GI ppx:  - Dispo: home

## 2024-10-09 NOTE — DISCHARGE NOTE PROVIDER - CARE PROVIDER_API CALL
Panda Win  [address] [place] [address]  Phone: [phone]  Fax: [phone]  Established Patient  Follow Up Time: 1 week

## 2024-10-09 NOTE — PROGRESS NOTE ADULT - PROBLEM SELECTOR PLAN 5
A1C:    - low SS inpatient  - FS TIDAC  - hold home metformin 1000 BID, farxiga 10mg daily, glyburide 1mg daily, trulicity 4.5mg weekly  - f/u lipid panel A1C: 6.7%    - low SS inpatient  - FS TIDAC  - hold home metformin 1000 BID, farxiga 10mg daily, glyburide 1mg daily, trulicity 4.5mg weekly  - f/u lipid panel  - hold farxiga on discharge for new UTI

## 2024-10-09 NOTE — DISCHARGE NOTE PROVIDER - NSDCCPCAREPLAN_GEN_ALL_CORE_FT
PRINCIPAL DISCHARGE DIAGNOSIS  Diagnosis: Acute UTI  Assessment and Plan of Treatment: You were admitted to the hospital because of a urinary tract infection. We treated you with antibiotics for 3 days and you reported resolution of your symptoms. Because you developed a urinary tract infection while on farxiga (a medication known to have the side effect of UTIs), we are advising that you do not continue to take farxiga after discharge. You can continue your other diabetes medication.  You should go back to the hospital with a urinary tract infection (UTI) if you experience any of the following symptoms:  Severe Pain: Intense abdominal or back pain that is not relieved by over-the-counter pain medications.  High Fever: A fever above 101°F (38.3°C), especially if accompanied by chills.  Nausea or Vomiting: If you are unable to keep fluids down, which can lead to dehydration.  Confusion or Altered Mental State: Especially in older adults, this can indicate a serious infection.  Blood in Urine: If you notice significant blood in your urine.  Symptoms Persist: If your symptoms worsen or do not improve with home treatment or antibiotics.

## 2024-10-09 NOTE — CHART NOTE - NSCHARTNOTEFT_GEN_A_CORE
Attempted to see the patient face to face, however the patient was not present to discuss care at that time. Outreached the phone numbers on file and left a voicemail for the patient to return our call.

## 2024-10-09 NOTE — PROGRESS NOTE ADULT - ASSESSMENT
59-year-old female with DM, HTN,  presented to the ED complaining having lower abdominal pressure and pain for last few weeks. Failed both macrobid and cipro, here for lack of resolution of urinary symptoms, now on ertapenem 1g daily x 7 days.  59-year-old female with DM, HTN,  presented to the ED complaining having lower abdominal pressure and pain for last few weeks. Failed both macrobid and cipro, here for lack of resolution of urinary symptoms, now on ertapenem 1g daily x 3 days.

## 2024-10-09 NOTE — DISCHARGE NOTE NURSING/CASE MANAGEMENT/SOCIAL WORK - PATIENT PORTAL LINK FT
You can access the FollowMyHealth Patient Portal offered by [facility] by registering at the following website: [email or web address]. By joining [employer]’s FollowMyHealth portal, you will also be able to view your health information using other applications (apps) compatible with our system.

## 2024-10-09 NOTE — DISCHARGE NOTE PROVIDER - HOSPITAL COURSE
HPI:  59-year-old female with DM, HTN,  presented to the ED complaining having lower abdominal pressure and pain for several weeks.  She states approximately 3 weeks ago she was started on antibiotics for a UTI for which she was prescribed a 5-day course of macrobid. She did not get relief from the antibiotics so she was switched to cipro x 7 days.  She states that she completed the antibiotic and was still having some discomfort, urgency, frequency, flank pain, and dysuria.  No n/v/d. Last night she endorsed subjective fever and chills. She took tylenol. She denies any chest pain exertional dyspnea dysuria recent travel or sick contacts.  She states that she has not had any intercourse recently and denies any vaginal bleeding or discharge.    ED: VSS T 98, HR 85, /70, RR 18, SA 98  Labs WBC 8.84 with 25% band neutrophils, K 3.3, Urinalysis with turbid urine, protein 30, ketone 15, moderate LE, positive nitrites, 717 WBC, 6 RBC, >1000 glucosuria  CTAP with cholelithiasis but no evidence of cholecystitis (06 Oct 2024 17:55)    Hospital Course:  Patient was admitted with bandemia and UTI failing resolution after 5 days of macrobid and 7 days of ciprofloxacin. She was complaining of persistent dysuria and abominal pain. Urine culture and blood cultures were performed with no growth. CTAP with no evidence of pyelonephritis. Patient was placed on 3 days of ertapenem 1g IV for UTI. Patient was febrile nightly requiring IV tylenol with exacerbation of her abdominal pain and dysuria. The cyclic fevers prompted a workup for tick bourne illnesses including lyme, babesia, anaplasmosis and ehrlichiosis. She was also tested for rheumatoid factor and TRISTA. Lyme and babesia were negative, the rest of the results were pending. She also received a TTE which showed an EF 56% no abnormalities. On the third night of her admission, she remained afebrile with no abdominal pain or dysuria.     For her home diabetes regimen, the patient is on farxiga 10mg daily. This medication was held at discharge due to the new occurrence UTI. Additionally, while admitted, the patient was normotensive and occasionally hypotensive so her losartan-HCTZ was held on discharge.    On the day of discharge, patient was afebrile and asymptomatic. She was medically cleared for discharge home.    Important Medication Changes and Reason:  - stop losartan-HCTZ  - stop farxiga  - restart    Active or Pending Issues Requiring Follow-up:  - f/u PCP  - f/u anaplasmosis/ehrlichiosis, rheumatoid factor, TRISTA    Advanced Directives:   [x] Full code  [ ] DNR  [ ] Hospice    Discharge Diagnoses:  Urinary Tract Infection  Diabetes  Hypertension       HPI:  59-year-old female with DM, HTN,  presented to the ED complaining having lower abdominal pressure and pain for several weeks. She states approximately 3 weeks ago she was started on antibiotics for a UTI for which she was prescribed a 5-day course of macrobid. She did not get relief from the antibiotics so she was switched to cipro x 7 days.  She states that she completed the antibiotic and was still having some discomfort, urgency, frequency, flank pain, and dysuria.  No n/v/d. Last night she endorsed subjective fever and chills. She took tylenol. She denies any chest pain exertional dyspnea dysuria recent travel or sick contacts.  She states that she has not had any intercourse recently and denies any vaginal bleeding or discharge.    ED: VSS T 98, HR 85, /70, RR 18, SA 98  Labs WBC 8.84 with 25% band neutrophils, K 3.3, Urinalysis with turbid urine, protein 30, ketone 15, moderate LE, positive nitrites, 717 WBC, 6 RBC, >1000 glucosuria  CTAP with cholelithiasis but no evidence of cholecystitis (06 Oct 2024 17:55)    Hospital Course:  Patient was admitted with bandemia and UTI failing resolution after 5 days of macrobid and 7 days of ciprofloxacin. She was complaining of persistent dysuria and abominal pain. Urine culture and blood cultures were performed with no growth. CTAP with no evidence of pyelonephritis. Patient was placed on 3 days of ertapenem 1g IV for UTI. Patient was febrile nightly requiring IV tylenol with exacerbation of her abdominal pain and dysuria. The cyclic fevers prompted a workup for tickborne illnesses including lyme, babesia, anaplasmosis and ehrlichiosis. She was also tested for rheumatoid factor and TRISTA. Lyme and babesia were negative, the rest of the results were pending. She also received a TTE which showed an EF 56% no abnormalities. On the third night of her admission, she remained afebrile with no abdominal pain or dysuria.     For her home diabetes regimen, the patient is on farxiga 10mg daily. This medication was held at discharge due to the new occurrence of refractory UTI. Additionally, while admitted, the patient was normotensive and occasionally hypotensive so her losartan-HCTZ was held on discharge.    On the day of discharge, patient was afebrile and asymptomatic. She was medically cleared for discharge home.    Important Medication Changes and Reason:  - stop losartan-HCTZ  - stop farxiga    Active or Pending Issues Requiring Follow-up:  - f/u PCP  - f/u anaplasmosis/ehrlichiosis, rheumatoid factor, TRISTA    Advanced Directives:   [x] Full code  [ ] DNR  [ ] Hospice    Discharge Diagnoses:  Urinary Tract Infection  Diabetes  Hypertension

## 2024-10-09 NOTE — DISCHARGE NOTE NURSING/CASE MANAGEMENT/SOCIAL WORK - NSDCPEFALRISK_GEN_ALL_CORE
For information on Fall & Injury Prevention, visit: [email or web address] OR  [email or web address] OR  https://www.cdc.gov/steadi/patient.html Differential Diagnosis sepsis  covid  flu  asthma/copd

## 2024-10-09 NOTE — PROGRESS NOTE ADULT - PROBLEM SELECTOR PLAN 2
Patient with nightly fevers controlled with tylenol. Dysuria and abdominal pain worsen at night as well. CTAP with no abscess or pyelonephritis. Patient has no history of recent travel, camping, hiking in the woods.     - f/u TTE  - cw ertapenem  - Bcx ngtd  - f/u babesia, lyme, anaplasmosis, RF, mora Patient with nightly fevers controlled with tylenol. Dysuria and abdominal pain worsen at night as well. CTAP with no abscess or pyelonephritis. Patient has no history of recent travel, camping, hiking in the woods.     - TTE with EF 56%  - cw ertapenem  - Bcx ngtd  - babesia and lyme serologies negative  - f/u anaplasmosis, RF, TRISTA

## 2024-10-09 NOTE — PROGRESS NOTE ADULT - SUBJECTIVE AND OBJECTIVE BOX
***************************************************************  Sabrina Trivedi, PGY-1  Internal Medicine   Available on [employer] TEAMS  ***************************************************************    NIKHIL SWEET  59y  MRN: [id number]    Patient is a 59y old  Female who presents with a chief complaint of UTI + bandemia (08 Oct 2024 07:47)      Interval/Overnight Events: no events ON.     Subjective: Pt seen and examined at bedside. Denies fever, CP, SOB, abn pain, N/V, dysuria. Tolerating diet.      MEDICATIONS  (STANDING):  dextrose 5%. 1000 milliLiter(s) (50 mL/Hr) IV Continuous <Continuous>  dextrose 5%. 1000 milliLiter(s) (100 mL/Hr) IV Continuous <Continuous>  dextrose 50% Injectable 25 Gram(s) IV Push once  dextrose 50% Injectable 12.5 Gram(s) IV Push once  dextrose 50% Injectable 25 Gram(s) IV Push once  enoxaparin Injectable 40 milliGRAM(s) SubCutaneous every 24 hours  ertapenem  IVPB 1000 milliGRAM(s) IV Intermittent every 24 hours  glucagon  Injectable 1 milliGRAM(s) IntraMuscular once  insulin lispro (ADMELOG) corrective regimen sliding scale   SubCutaneous at bedtime  insulin lispro (ADMELOG) corrective regimen sliding scale   SubCutaneous three times a day before meals  senna 2 Tablet(s) Oral at bedtime    MEDICATIONS  (PRN):  acetaminophen     Tablet .. 650 milliGRAM(s) Oral every 6 hours PRN Temp greater or equal to 38C (100.4F), Mild Pain (1 - 3)  acetaminophen   IVPB .. 1000 milliGRAM(s) IV Intermittent every 8 hours PRN Temp greater or equal to 38C (100.4F), Moderate Pain (4 - 6)  dextrose Oral Gel 15 Gram(s) Oral once PRN Blood Glucose LESS THAN 70 milliGRAM(s)/deciliter  melatonin 3 milliGRAM(s) Oral at bedtime PRN Insomnia  phenazopyridine 100 milliGRAM(s) Oral every 8 hours PRN dysuria      Objective:    Vitals: Vital Signs Last 24 Hrs  T(C): 36.9 (10-09-24 @ 06:21), Max: 37 (10-08-24 @ 20:58)  T(F): 98.4 (10-09-24 @ 06:21), Max: 98.6 (10-08-24 @ 20:58)  HR: 80 (10-09-24 @ 06:21) (76 - 91)  BP: 105/63 (10-09-24 @ 06:21) (94/61 - 107/71)  BP(mean): --  RR: 17 (10-09-24 @ 06:21) (16 - 18)  SpO2: 99% (10-09-24 @ 06:21) (98% - 100%)                I&O's Summary    08 Oct 2024 07:01  -  09 Oct 2024 07:00  --------------------------------------------------------  IN: 770 mL / OUT: 0 mL / NET: 770 mL        PHYSICAL EXAM:  GENERAL: NAD  HEAD:  Atraumatic, Normocephalic  EYES: EOMI, conjunctiva and sclera clear  CHEST/LUNG: Clear to auscultation bilaterally; No rales, rhonchi, wheezing, or rubs  HEART: Regular rate and rhythm; No murmurs, rubs, or gallops  ABDOMEN: Soft, Nontender, Nondistended;   SKIN: No rashes or lesions  NERVOUS SYSTEM:  Alert & Oriented X3, no focal deficits    LABS:                        12.4   8.06  )-----------( 144      ( 08 Oct 2024 06:39 )             36.2                         12.9   7.33  )-----------( 143      ( 07 Oct 2024 06:19 )             38.2                         13.9   8.84  )-----------( 179      ( 06 Oct 2024 14:06 )             40.0     10-08    137  |  101  |  12  ----------------------------<  133[H]  3.0[L]   |  23  |  0.61  10-07    137  |  97[L]  |  12  ----------------------------<  106[H]  2.9[LL]   |  24  |  0.73  10-06    135  |  92[L]  |  22  ----------------------------<  155[H]  3.3[L]   |  23  |  0.94    Ca    8.5      08 Oct 2024 06:39  Ca    8.9      07 Oct 2024 06:19  Ca    9.2      06 Oct 2024 14:06  Phos  2.6     10-08  Mg     2.30     10-08    TPro  6.3  /  Alb  3.7  /  TBili  0.5  /  DBili  x   /  AST  20  /  ALT  22  /  AlkPhos  65  10-08  TPro  6.8  /  Alb  3.9  /  TBili  0.5  /  DBili  x   /  AST  26  /  ALT  23  /  AlkPhos  68  10-07  TPro  7.3  /  Alb  4.4  /  TBili  0.7  /  DBili  x   /  AST  26  /  ALT  25  /  AlkPhos  74  10-06    CAPILLARY BLOOD GLUCOSE      POCT Blood Glucose.: 258 mg/dL (08 Oct 2024 22:07)  POCT Blood Glucose.: 212 mg/dL (08 Oct 2024 17:36)  POCT Blood Glucose.: 215 mg/dL (08 Oct 2024 12:03)  POCT Blood Glucose.: 134 mg/dL (08 Oct 2024 08:19)        Urinalysis Basic - ( 08 Oct 2024 06:39 )    Color: x / Appearance: x / SG: x / pH: x  Gluc: 133 mg/dL / Ketone: x  / Bili: x / Urobili: x   Blood: x / Protein: x / Nitrite: x   Leuk Esterase: x / RBC: x / WBC x   Sq Epi: x / Non Sq Epi: x / Bacteria: x          RADIOLOGY & ADDITIONAL TESTS:         ***************************************************************  Sabrina Trivedi, PGY-1  Internal Medicine   Available on [employer] TEAMS  ***************************************************************    NIKHIL SWEET  59y  MRN: [id number]    Patient is a 59y old  Female who presents with a chief complaint of UTI + bandemia (08 Oct 2024 07:47)      Interval/Overnight Events: no events ON.     Subjective: Pt seen and examined at bedside. No fever, dysuria, or abdominal pain overnight. No headache, nausea, vomiting, constipation.    MEDICATIONS  (STANDING):  dextrose 5%. 1000 milliLiter(s) (50 mL/Hr) IV Continuous <Continuous>  dextrose 5%. 1000 milliLiter(s) (100 mL/Hr) IV Continuous <Continuous>  dextrose 50% Injectable 25 Gram(s) IV Push once  dextrose 50% Injectable 12.5 Gram(s) IV Push once  dextrose 50% Injectable 25 Gram(s) IV Push once  enoxaparin Injectable 40 milliGRAM(s) SubCutaneous every 24 hours  ertapenem  IVPB 1000 milliGRAM(s) IV Intermittent every 24 hours  glucagon  Injectable 1 milliGRAM(s) IntraMuscular once  insulin lispro (ADMELOG) corrective regimen sliding scale   SubCutaneous at bedtime  insulin lispro (ADMELOG) corrective regimen sliding scale   SubCutaneous three times a day before meals  senna 2 Tablet(s) Oral at bedtime    MEDICATIONS  (PRN):  acetaminophen     Tablet .. 650 milliGRAM(s) Oral every 6 hours PRN Temp greater or equal to 38C (100.4F), Mild Pain (1 - 3)  acetaminophen   IVPB .. 1000 milliGRAM(s) IV Intermittent every 8 hours PRN Temp greater or equal to 38C (100.4F), Moderate Pain (4 - 6)  dextrose Oral Gel 15 Gram(s) Oral once PRN Blood Glucose LESS THAN 70 milliGRAM(s)/deciliter  melatonin 3 milliGRAM(s) Oral at bedtime PRN Insomnia  phenazopyridine 100 milliGRAM(s) Oral every 8 hours PRN dysuria      Objective:    Vitals: Vital Signs Last 24 Hrs  T(C): 36.9 (10-09-24 @ 06:21), Max: 37 (10-08-24 @ 20:58)  T(F): 98.4 (10-09-24 @ 06:21), Max: 98.6 (10-08-24 @ 20:58)  HR: 80 (10-09-24 @ 06:21) (76 - 91)  BP: 105/63 (10-09-24 @ 06:21) (94/61 - 107/71)  BP(mean): --  RR: 17 (10-09-24 @ 06:21) (16 - 18)  SpO2: 99% (10-09-24 @ 06:21) (98% - 100%)                I&O's Summary    08 Oct 2024 07:01  -  09 Oct 2024 07:00  --------------------------------------------------------  IN: 770 mL / OUT: 0 mL / NET: 770 mL        PHYSICAL EXAM:  GENERAL: NAD  HEAD:  Atraumatic, Normocephalic  EYES: EOMI, conjunctiva and sclera clear  CHEST/LUNG: Clear to auscultation bilaterally; No rales, rhonchi, wheezing, or rubs  HEART: Regular rate and rhythm; No murmurs, rubs, or gallops  ABDOMEN: Soft, Nontender, Nondistended;   SKIN: No rashes or lesions  NERVOUS SYSTEM:  Alert & Oriented X3, no focal deficits    LABS:                        12.4   8.06  )-----------( 144      ( 08 Oct 2024 06:39 )             36.2                         12.9   7.33  )-----------( 143      ( 07 Oct 2024 06:19 )             38.2                         13.9   8.84  )-----------( 179      ( 06 Oct 2024 14:06 )             40.0     10-08    137  |  101  |  12  ----------------------------<  133[H]  3.0[L]   |  23  |  0.61  10-07    137  |  97[L]  |  12  ----------------------------<  106[H]  2.9[LL]   |  24  |  0.73  10-06    135  |  92[L]  |  22  ----------------------------<  155[H]  3.3[L]   |  23  |  0.94    Ca    8.5      08 Oct 2024 06:39  Ca    8.9      07 Oct 2024 06:19  Ca    9.2      06 Oct 2024 14:06  Phos  2.6     10-08  Mg     2.30     10-08    TPro  6.3  /  Alb  3.7  /  TBili  0.5  /  DBili  x   /  AST  20  /  ALT  22  /  AlkPhos  65  10-08  TPro  6.8  /  Alb  3.9  /  TBili  0.5  /  DBili  x   /  AST  26  /  ALT  23  /  AlkPhos  68  10-07  TPro  7.3  /  Alb  4.4  /  TBili  0.7  /  DBili  x   /  AST  26  /  ALT  25  /  AlkPhos  74  10-06    CAPILLARY BLOOD GLUCOSE      POCT Blood Glucose.: 258 mg/dL (08 Oct 2024 22:07)  POCT Blood Glucose.: 212 mg/dL (08 Oct 2024 17:36)  POCT Blood Glucose.: 215 mg/dL (08 Oct 2024 12:03)  POCT Blood Glucose.: 134 mg/dL (08 Oct 2024 08:19)        Urinalysis Basic - ( 08 Oct 2024 06:39 )    Color: x / Appearance: x / SG: x / pH: x  Gluc: 133 mg/dL / Ketone: x  / Bili: x / Urobili: x   Blood: x / Protein: x / Nitrite: x   Leuk Esterase: x / RBC: x / WBC x   Sq Epi: x / Non Sq Epi: x / Bacteria: x          RADIOLOGY & ADDITIONAL TESTS:

## 2024-10-09 NOTE — DISCHARGE NOTE PROVIDER - NSDCCPTREATMENT_GEN_ALL_CORE_FT
PRINCIPAL PROCEDURE  Procedure: CT scan of abdomen and pelvis with and without contrast  Findings and Treatment:   < end of copied text >  IMPRESSION:  Cholelithiasis without CT evidence of cholecystitis.  < end of copied text         SECONDARY PROCEDURE  Procedure: Complete transthoracic echocardiography (TTE)  Findings and Treatment:   < end of copied text >   Left ventricular systolic function is normal with an ejection fraction of 56 % by Infante's method of disks.   2. Normal right ventricular cavity size, with normal wall thickness, and normal right ventricular systolic function.   3. Left atrium is normal in size.   4. No significant valvular disease.   5. No pericardial effusion seen.   6. Left ventricular global longitudinal strain is -12.0 % is abnormal (> -16%). Images were acquired on a [employer] ultrasound system and processed on the ultrasound machine with a heart rate of 84 bpm and a blood pressure of 104/72 mmHg.   7. The inferior vena cava is normal in size (normal <2.1cm) with abnormal inspiratory collapse (abnormal <50%) consistent with mildly elevated right atrial pressure (~8, range 5-10mmHg).  < from: TTE W or WO Ultrasound Enhancing Agent (10.08.24 @ 14:34) >

## 2024-10-09 NOTE — DISCHARGE NOTE PROVIDER - NSDCFUSCHEDAPPT_GEN_ALL_CORE_FT
Aries Olivia  [facility] [address]  Scheduled Appointment: 11/13/2024    [facility] [address]  Scheduled Appointment: 11/13/2024

## 2024-10-09 NOTE — PROGRESS NOTE ADULT - PROBLEM SELECTOR PLAN 1
UTI s/p Ciprofloxacin x 7 days and Macrobid x 5 days. S/p 1g ertapenem in the ED. Urinalysis with turpid urine, protein 30, ketone 15, moderate LE, positive nitrites, 717 WBC, 6 RBC, >1000 glucosuria. Urine culture outpatient on 10/4 only with urogenital payton.   UCx 10/6 with normal urogenital payton    - bcx NGTD  - c/w ertapenem 1g q12h (10/7-10/13) UTI s/p Ciprofloxacin x 7 days and Macrobid x 5 days. S/p 1g ertapenem in the ED. Urinalysis with turpid urine, protein 30, ketone 15, moderate LE, positive nitrites, 717 WBC, 6 RBC, >1000 glucosuria. Urine culture outpatient on 10/4 only with urogenital payton.   UCx 10/6 with normal urogenital payton    - bcx NGTD  - c/w ertapenem 1g daily UTI s/p Ciprofloxacin x 7 days and Macrobid x 5 days. S/p 1g ertapenem in the ED. Urinalysis with turpid urine, protein 30, ketone 15, moderate LE, positive nitrites, 717 WBC, 6 RBC, >1000 glucosuria. Urine culture outpatient on 10/4 only with urogenital payton.   UCx 10/6 with normal urogenital payton    - bcx NGTD  - c/w ertapenem 1g x3 days

## 2024-10-09 NOTE — DISCHARGE NOTE PROVIDER - NSDCFUADDAPPT_GEN_ALL_CORE_FT
APPTS ARE READY TO BE MADE: [x] YES    Best Family or Patient Contact (if needed):    Additional Information about above appointments (if needed):    1:   2:   3:     Other comments or requests:    APPTS ARE READY TO BE MADE: [x] YES    Best Family or Patient Contact (if needed):    Additional Information about above appointments (if needed):    1:   2:   3:     Other comments or requests:     Provided patient with provider referral information, however patient prefers to schedule the appointments on their own.

## 2024-10-09 NOTE — DISCHARGE NOTE PROVIDER - ATTENDING DISCHARGE PHYSICAL EXAMINATION:
.  VITAL SIGNS:  T(C): 36.7 (10-09-24 @ 09:34), Max: 37 (10-08-24 @ 20:58)  T(F): 98.1 (10-09-24 @ 09:34), Max: 98.6 (10-08-24 @ 20:58)  HR: 90 (10-09-24 @ 09:34) (76 - 91)  BP: 117/77 (10-09-24 @ 09:34) (94/61 - 117/77)  BP(mean): --  RR: 18 (10-09-24 @ 09:34) (16 - 18)  SpO2: 98% (10-09-24 @ 09:34) (98% - 100%)  Wt(kg): --    PHYSICAL EXAM:    Constitutional: WDWN resting comfortably in bed; NAD  Head: NC/AT  Eyes: PERRL, EOMI, clear conjunctiva  ENT: no nasal discharge; uvula midline, no oropharyngeal erythema or exudates;   Neck: supple; no JVD or thyromegaly  Respiratory: CTA B/L;  Cardiac: +S1/S2; RRR;   Gastrointestinal: soft, NT/ND; no rebound or guarding; +BSx4  Genitourinary: normal external genitalia  Back: spine midline, no bony tenderness or step-offs; no CVAT B/L  Extremities: WWP, no clubbing or cyanosis; no peripheral edema  Lymphatic: no submandibular or cervical LAD  Neurologic: AAOx3; CNII-XII grossly intact; no focal deficits  Psychiatric: affect and characteristics of appearance, verbalizations, behaviors are appropriate

## 2024-10-09 NOTE — DISCHARGE NOTE PROVIDER - NSDCMRMEDTOKEN_GEN_ALL_CORE_FT
glimepiride 1 mg oral tablet: 1 tab(s) orally once a day  metFORMIN 1000 mg oral tablet: 1 tab(s) orally 2 times a day  Trulicity Pen 4.5 mg/0.5 mL subcutaneous solution: 4.5 milligram(s) subcutaneously once a week

## 2024-10-11 LAB
A PHAGOCYTOPH DNA BLD QL NAA+PROBE: NEGATIVE — SIGNIFICANT CHANGE UP
CULTURE RESULTS: SIGNIFICANT CHANGE UP
CULTURE RESULTS: SIGNIFICANT CHANGE UP
E CHAFFEENSIS DNA BLD QL NAA+PROBE: NEGATIVE — SIGNIFICANT CHANGE UP
E EWINGII DNA SPEC QL NAA+PROBE: NEGATIVE — SIGNIFICANT CHANGE UP
EHRLICHIA DNA SPEC QL NAA+PROBE: NEGATIVE — SIGNIFICANT CHANGE UP
RF IGA SER-ACNC: <5 U — SIGNIFICANT CHANGE UP
RF IGG SER-ACNC: <5 U — SIGNIFICANT CHANGE UP
RF IGM SER-ACNC: <5 U — SIGNIFICANT CHANGE UP
SPECIMEN SOURCE: SIGNIFICANT CHANGE UP
SPECIMEN SOURCE: SIGNIFICANT CHANGE UP

## 2024-10-13 LAB
B BURGDOR DNA SPEC QL NAA+PROBE: NEGATIVE — SIGNIFICANT CHANGE UP
B MICROTI IGG TITR SER: SIGNIFICANT CHANGE UP
B MICROTI IGM TITR SER: SIGNIFICANT CHANGE UP
CULTURE RESULTS: SIGNIFICANT CHANGE UP
CULTURE RESULTS: SIGNIFICANT CHANGE UP
SPECIMEN SOURCE: SIGNIFICANT CHANGE UP
SPECIMEN SOURCE: SIGNIFICANT CHANGE UP

## 2024-11-13 ENCOUNTER — APPOINTMENT (OUTPATIENT)
Dept: OBGYN | Facility: CLINIC | Age: 59
End: 2024-11-13
Payer: COMMERCIAL

## 2024-11-13 ENCOUNTER — ASOB RESULT (OUTPATIENT)
Age: 59
End: 2024-11-13

## 2024-11-13 VITALS
HEIGHT: 69 IN | DIASTOLIC BLOOD PRESSURE: 124 MMHG | WEIGHT: 164 LBS | BODY MASS INDEX: 24.29 KG/M2 | SYSTOLIC BLOOD PRESSURE: 162 MMHG

## 2024-11-13 DIAGNOSIS — N89.8 OTHER SPECIFIED NONINFLAMMATORY DISORDERS OF VAGINA: ICD-10-CM

## 2024-11-13 DIAGNOSIS — Z00.00 ENCOUNTER FOR GENERAL ADULT MEDICAL EXAMINATION W/OUT ABNORMAL FINDINGS: ICD-10-CM

## 2024-11-13 PROCEDURE — 99396 PREV VISIT EST AGE 40-64: CPT

## 2024-11-13 PROCEDURE — 82270 OCCULT BLOOD FECES: CPT

## 2024-11-13 PROCEDURE — 76830 TRANSVAGINAL US NON-OB: CPT

## 2024-11-13 PROCEDURE — 87210 SMEAR WET MOUNT SALINE/INK: CPT | Mod: QW

## 2024-11-14 LAB — HPV HIGH+LOW RISK DNA PNL CVX: NOT DETECTED

## 2024-11-15 LAB — BACTERIA UR CULT: NORMAL

## 2024-11-17 LAB — CYTOLOGY CVX/VAG DOC THIN PREP: NORMAL

## 2024-12-17 NOTE — DISCHARGE NOTE PROVIDER - NSDCCONDITION_GEN_ALL_CORE
Addendum  created 12/17/24 1140 by MAI Calvillo    Intraprocedure Meds edited, Orders acknowledged in Narrator       Stable